# Patient Record
Sex: FEMALE | Race: ASIAN | HISPANIC OR LATINO | Employment: OTHER | URBAN - METROPOLITAN AREA
[De-identification: names, ages, dates, MRNs, and addresses within clinical notes are randomized per-mention and may not be internally consistent; named-entity substitution may affect disease eponyms.]

---

## 2022-05-27 LAB — RUBELLA ANTIBODY IGG (EXTERNAL): NORMAL

## 2022-07-07 ENCOUNTER — HOSPITAL ENCOUNTER (OUTPATIENT)
Facility: CLINIC | Age: 27
Discharge: HOME | End: 2022-07-08
Attending: ADVANCED PRACTICE MIDWIFE | Admitting: MIDWIFE
Payer: COMMERCIAL

## 2022-07-07 ENCOUNTER — TRANSFERRED RECORDS (OUTPATIENT)
Dept: OTHER | Facility: CLINIC | Age: 27
End: 2022-07-07

## 2022-07-07 DIAGNOSIS — O23.599 BACTERIAL VAGINOSIS IN PREGNANCY: Primary | ICD-10-CM

## 2022-07-07 DIAGNOSIS — B96.89 BACTERIAL VAGINOSIS IN PREGNANCY: Primary | ICD-10-CM

## 2022-07-07 LAB
ALBUMIN UR-MCNC: NEGATIVE MG/DL
APPEARANCE UR: CLEAR
BILIRUB UR QL STRIP: NEGATIVE
CLUE CELLS: PRESENT
COLOR UR AUTO: YELLOW
GLUCOSE UR STRIP-MCNC: NEGATIVE MG/DL
GROUP B STREPTOCOCCUS (EXTERNAL): POSITIVE
HGB UR QL STRIP: NEGATIVE
KETONES UR STRIP-MCNC: 10 MG/DL
LEUKOCYTE ESTERASE UR QL STRIP: NEGATIVE
MUCOUS THREADS #/AREA URNS LPF: PRESENT /LPF
NITRATE UR QL: NEGATIVE
PH UR STRIP: 5.5 [PH] (ref 5–7)
RBC URINE: 1 /HPF
RUPTURE OF FETAL MEMBRANES BY ROM PLUS: NEGATIVE
SARS-COV-2 RNA RESP QL NAA+PROBE: NEGATIVE
SP GR UR STRIP: 1.02 (ref 1–1.03)
SQUAMOUS EPITHELIAL: 2 /HPF
TRICHOMONAS, WET PREP: ABNORMAL
UROBILINOGEN UR STRIP-MCNC: NORMAL MG/DL
WBC URINE: 2 /HPF
WBC'S/HIGH POWER FIELD, WET PREP: ABNORMAL
YEAST, WET PREP: ABNORMAL

## 2022-07-07 PROCEDURE — 87077 CULTURE AEROBIC IDENTIFY: CPT | Performed by: OBSTETRICS & GYNECOLOGY

## 2022-07-07 PROCEDURE — 250N000011 HC RX IP 250 OP 636

## 2022-07-07 PROCEDURE — 258N000003 HC RX IP 258 OP 636: Performed by: MIDWIFE

## 2022-07-07 PROCEDURE — G0463 HOSPITAL OUTPT CLINIC VISIT: HCPCS | Mod: 25

## 2022-07-07 PROCEDURE — 96360 HYDRATION IV INFUSION INIT: CPT

## 2022-07-07 PROCEDURE — U0005 INFEC AGEN DETEC AMPLI PROBE: HCPCS | Performed by: OBSTETRICS & GYNECOLOGY

## 2022-07-07 PROCEDURE — 87210 SMEAR WET MOUNT SALINE/INK: CPT | Performed by: OBSTETRICS & GYNECOLOGY

## 2022-07-07 PROCEDURE — 96372 THER/PROPH/DIAG INJ SC/IM: CPT

## 2022-07-07 PROCEDURE — 81003 URINALYSIS AUTO W/O SCOPE: CPT | Performed by: MIDWIFE

## 2022-07-07 PROCEDURE — 59025 FETAL NON-STRESS TEST: CPT

## 2022-07-07 PROCEDURE — 258N000003 HC RX IP 258 OP 636: Performed by: OBSTETRICS & GYNECOLOGY

## 2022-07-07 PROCEDURE — 84112 EVAL AMNIOTIC FLUID PROTEIN: CPT | Performed by: MIDWIFE

## 2022-07-07 PROCEDURE — 250N000013 HC RX MED GY IP 250 OP 250 PS 637: Performed by: OBSTETRICS & GYNECOLOGY

## 2022-07-07 RX ORDER — PROCHLORPERAZINE 25 MG
25 SUPPOSITORY, RECTAL RECTAL EVERY 12 HOURS PRN
Status: DISCONTINUED | OUTPATIENT
Start: 2022-07-07 | End: 2022-07-08 | Stop reason: HOSPADM

## 2022-07-07 RX ORDER — PRENATAL VIT/IRON FUM/FOLIC AC 27MG-0.8MG
TABLET ORAL DAILY
Status: DISCONTINUED | OUTPATIENT
Start: 2022-07-08 | End: 2022-07-08 | Stop reason: HOSPADM

## 2022-07-07 RX ORDER — BETAMETHASONE SODIUM PHOSPHATE AND BETAMETHASONE ACETATE 3; 3 MG/ML; MG/ML
INJECTION, SUSPENSION INTRA-ARTICULAR; INTRALESIONAL; INTRAMUSCULAR; SOFT TISSUE
Status: COMPLETED
Start: 2022-07-07 | End: 2022-07-07

## 2022-07-07 RX ORDER — SODIUM CHLORIDE 9 MG/ML
INJECTION, SOLUTION INTRAVENOUS CONTINUOUS
Status: DISCONTINUED | OUTPATIENT
Start: 2022-07-07 | End: 2022-07-08 | Stop reason: HOSPADM

## 2022-07-07 RX ORDER — ONDANSETRON 4 MG/1
4 TABLET, ORALLY DISINTEGRATING ORAL EVERY 6 HOURS PRN
Status: DISCONTINUED | OUTPATIENT
Start: 2022-07-07 | End: 2022-07-08 | Stop reason: HOSPADM

## 2022-07-07 RX ORDER — BETAMETHASONE SODIUM PHOSPHATE AND BETAMETHASONE ACETATE 3; 3 MG/ML; MG/ML
12 INJECTION, SUSPENSION INTRA-ARTICULAR; INTRALESIONAL; INTRAMUSCULAR; SOFT TISSUE ONCE
Status: COMPLETED | OUTPATIENT
Start: 2022-07-07 | End: 2022-07-07

## 2022-07-07 RX ORDER — PROCHLORPERAZINE MALEATE 5 MG
10 TABLET ORAL EVERY 6 HOURS PRN
Status: DISCONTINUED | OUTPATIENT
Start: 2022-07-07 | End: 2022-07-08 | Stop reason: HOSPADM

## 2022-07-07 RX ORDER — METOCLOPRAMIDE 10 MG/1
10 TABLET ORAL EVERY 6 HOURS PRN
Status: DISCONTINUED | OUTPATIENT
Start: 2022-07-07 | End: 2022-07-08 | Stop reason: HOSPADM

## 2022-07-07 RX ORDER — METOCLOPRAMIDE HYDROCHLORIDE 5 MG/ML
10 INJECTION INTRAMUSCULAR; INTRAVENOUS EVERY 6 HOURS PRN
Status: DISCONTINUED | OUTPATIENT
Start: 2022-07-07 | End: 2022-07-08 | Stop reason: HOSPADM

## 2022-07-07 RX ORDER — HYDROXYZINE HYDROCHLORIDE 25 MG/1
25 TABLET, FILM COATED ORAL
Status: DISCONTINUED | OUTPATIENT
Start: 2022-07-07 | End: 2022-07-08 | Stop reason: HOSPADM

## 2022-07-07 RX ORDER — NIFEDIPINE 10 MG/1
20 CAPSULE ORAL EVERY 6 HOURS
Status: DISCONTINUED | OUTPATIENT
Start: 2022-07-07 | End: 2022-07-08 | Stop reason: HOSPADM

## 2022-07-07 RX ORDER — TERBUTALINE SULFATE 1 MG/ML
0.25 INJECTION, SOLUTION SUBCUTANEOUS ONCE
Status: COMPLETED | OUTPATIENT
Start: 2022-07-07 | End: 2022-07-07

## 2022-07-07 RX ORDER — ONDANSETRON 2 MG/ML
4 INJECTION INTRAMUSCULAR; INTRAVENOUS EVERY 6 HOURS PRN
Status: DISCONTINUED | OUTPATIENT
Start: 2022-07-07 | End: 2022-07-08 | Stop reason: HOSPADM

## 2022-07-07 RX ORDER — TERBUTALINE SULFATE 1 MG/ML
INJECTION, SOLUTION SUBCUTANEOUS
Status: COMPLETED
Start: 2022-07-07 | End: 2022-07-07

## 2022-07-07 RX ORDER — METRONIDAZOLE 500 MG/1
500 TABLET ORAL 2 TIMES DAILY
Status: DISCONTINUED | OUTPATIENT
Start: 2022-07-08 | End: 2022-07-08 | Stop reason: HOSPADM

## 2022-07-07 RX ADMIN — BETAMETHASONE SODIUM PHOSPHATE AND BETAMETHASONE ACETATE 12 MG: 3; 3 INJECTION, SUSPENSION INTRA-ARTICULAR; INTRALESIONAL; INTRAMUSCULAR at 18:24

## 2022-07-07 RX ADMIN — TERBUTALINE SULFATE 0.25 MG: 1 INJECTION, SOLUTION SUBCUTANEOUS at 18:22

## 2022-07-07 RX ADMIN — BETAMETHASONE SODIUM PHOSPHATE AND BETAMETHASONE ACETATE 12 MG: 3; 3 INJECTION, SUSPENSION INTRA-ARTICULAR; INTRALESIONAL; INTRAMUSCULAR; SOFT TISSUE at 18:24

## 2022-07-07 RX ADMIN — SODIUM CHLORIDE: 9 INJECTION, SOLUTION INTRAVENOUS at 21:49

## 2022-07-07 RX ADMIN — TERBUTALINE SULFATE 0.25 MG: 1 INJECTION SUBCUTANEOUS at 18:22

## 2022-07-07 RX ADMIN — SODIUM CHLORIDE, POTASSIUM CHLORIDE, SODIUM LACTATE AND CALCIUM CHLORIDE 1000 ML: 600; 310; 30; 20 INJECTION, SOLUTION INTRAVENOUS at 16:39

## 2022-07-07 RX ADMIN — HYDROXYZINE HYDROCHLORIDE 25 MG: 25 TABLET, FILM COATED ORAL at 23:05

## 2022-07-07 RX ADMIN — NIFEDIPINE 20 MG: 10 CAPSULE ORAL at 21:32

## 2022-07-07 NOTE — PLAN OF CARE
"Pt comes to triage with c/o some intermittent abdominal pain/pressure, as well as a 1\"x3\" strip of white mucous in her panties.    EUM/US applied. VSS. Admission database obtained and prenatal record reviewed.    DARRYN Mari called to discuss Pt's status. Order received for   UA, SVE, and ROM+.    SVE performed by RN with no previous office exam for comparison..    Update to DARRYN Mari, and oral hydration/discharge order received. CNM called again a short while later, and Pt's ctxs had gotten closer and stronger.  IV hydration order received.    IV fluids given. Mallory paged after completion, but she was in a delivery, so she stated she would come see ME/Pt when she was done.     CNM reviewed strip and increase in ctxs. Order received for Terbutaline/Betamethasone. These meds were given, while CNM updated Dr. Majano.    Pt stated that ctxs are getting less intense, and she doesn't feel all that are being traced.    Report given to Sarita Modi RN.   "

## 2022-07-08 ENCOUNTER — HOSPITAL ENCOUNTER (OUTPATIENT)
Facility: CLINIC | Age: 27
End: 2022-07-08
Admitting: MIDWIFE
Payer: COMMERCIAL

## 2022-07-08 VITALS
BODY MASS INDEX: 28.49 KG/M2 | DIASTOLIC BLOOD PRESSURE: 65 MMHG | TEMPERATURE: 98 F | HEIGHT: 65 IN | OXYGEN SATURATION: 95 % | SYSTOLIC BLOOD PRESSURE: 108 MMHG | WEIGHT: 171 LBS | RESPIRATION RATE: 16 BRPM

## 2022-07-08 LAB
ABO/RH(D): NORMAL
ANTIBODY SCREEN: NEGATIVE
ERYTHROCYTE [DISTWIDTH] IN BLOOD BY AUTOMATED COUNT: 12.1 % (ref 10–15)
HCT VFR BLD AUTO: 32.4 % (ref 35–47)
HGB BLD-MCNC: 11.2 G/DL (ref 11.7–15.7)
MCH RBC QN AUTO: 32.7 PG (ref 26.5–33)
MCHC RBC AUTO-ENTMCNC: 34.6 G/DL (ref 31.5–36.5)
MCV RBC AUTO: 95 FL (ref 78–100)
PLATELET # BLD AUTO: 275 10E3/UL (ref 150–450)
RBC # BLD AUTO: 3.43 10E6/UL (ref 3.8–5.2)
SPECIMEN EXPIRATION DATE: NORMAL
WBC # BLD AUTO: 10.7 10E3/UL (ref 4–11)

## 2022-07-08 PROCEDURE — 250N000011 HC RX IP 250 OP 636: Performed by: OBSTETRICS & GYNECOLOGY

## 2022-07-08 PROCEDURE — 258N000003 HC RX IP 258 OP 636: Performed by: OBSTETRICS & GYNECOLOGY

## 2022-07-08 PROCEDURE — 86850 RBC ANTIBODY SCREEN: CPT | Performed by: OBSTETRICS & GYNECOLOGY

## 2022-07-08 PROCEDURE — 96372 THER/PROPH/DIAG INJ SC/IM: CPT | Performed by: OBSTETRICS & GYNECOLOGY

## 2022-07-08 PROCEDURE — 36415 COLL VENOUS BLD VENIPUNCTURE: CPT | Performed by: OBSTETRICS & GYNECOLOGY

## 2022-07-08 PROCEDURE — 85027 COMPLETE CBC AUTOMATED: CPT | Performed by: OBSTETRICS & GYNECOLOGY

## 2022-07-08 PROCEDURE — 250N000013 HC RX MED GY IP 250 OP 250 PS 637: Performed by: OBSTETRICS & GYNECOLOGY

## 2022-07-08 RX ORDER — BETAMETHASONE SODIUM PHOSPHATE AND BETAMETHASONE ACETATE 3; 3 MG/ML; MG/ML
12 INJECTION, SUSPENSION INTRA-ARTICULAR; INTRALESIONAL; INTRAMUSCULAR; SOFT TISSUE ONCE
Status: COMPLETED | OUTPATIENT
Start: 2022-07-08 | End: 2022-07-08

## 2022-07-08 RX ORDER — METRONIDAZOLE 500 MG/1
500 TABLET ORAL 2 TIMES DAILY
Qty: 10 TABLET | Refills: 0 | Status: SHIPPED | OUTPATIENT
Start: 2022-07-08 | End: 2022-07-13

## 2022-07-08 RX ADMIN — PRENATAL VITAMINS-IRON FUMARATE 27 MG IRON-FOLIC ACID 0.8 MG TABLET 1 TABLET: at 12:52

## 2022-07-08 RX ADMIN — METRONIDAZOLE 500 MG: 500 TABLET, FILM COATED ORAL at 03:09

## 2022-07-08 RX ADMIN — METRONIDAZOLE 500 MG: 500 TABLET, FILM COATED ORAL at 09:03

## 2022-07-08 RX ADMIN — NIFEDIPINE 20 MG: 10 CAPSULE ORAL at 03:09

## 2022-07-08 RX ADMIN — METRONIDAZOLE 500 MG: 500 TABLET, FILM COATED ORAL at 17:57

## 2022-07-08 RX ADMIN — BETAMETHASONE SODIUM PHOSPHATE AND BETAMETHASONE ACETATE 12 MG: 3; 3 INJECTION, SUSPENSION INTRA-ARTICULAR; INTRALESIONAL; INTRAMUSCULAR at 18:00

## 2022-07-08 RX ADMIN — NIFEDIPINE 20 MG: 10 CAPSULE ORAL at 15:01

## 2022-07-08 RX ADMIN — SODIUM CHLORIDE: 9 INJECTION, SOLUTION INTRAVENOUS at 05:44

## 2022-07-08 RX ADMIN — NIFEDIPINE 20 MG: 10 CAPSULE ORAL at 09:48

## 2022-07-08 NOTE — PLAN OF CARE
Category 1 tracing.  Pt not feeling ctx.  Pt denies bleeding, ctx or leaking fluid.  Phone call to Dr Springer, orders given to discontinue continuous fetal monitoring, saline lock IV, give Betamethasone 2nd dose at 1824.  Orders to discharge patient after betamethasone if patient remains stable.  Will continue to monitor and assess.

## 2022-07-08 NOTE — PROVIDER NOTIFICATION
07/07/22 1952   Provider Notification   Provider Name/Title Dr Majano   Method of Notification Phone   Request Evaluate - Remote   Notification Reason Uterine Activity;Pain       Admit patient for overnight observation due to frequency of contractions.

## 2022-07-08 NOTE — PROGRESS NOTES
"OB Antepartum Note - Hospital Day 2    S:  Patient is doing well.  Decreasing contractions. The patient was sleeping soundly when rounds were made  No vaginal bleeding.  Baby active.  No LOF.    Tolerating diet.      O:  /59   Temp 98.4  F (36.9  C) (Temporal)   Resp 14   Ht 1.651 m (5' 5\")   Wt 77.6 kg (171 lb)   LMP 2021   SpO2 95%   BMI 28.46 kg/m    Gen-A&O, NAD  Abd- Gravid, non-tender  EFM-  Baseline 135, accels are present, moderate variability, no decelerations  [place]- uterine irritability only  Cervix-  Cervix not rechecked since admission (was FT, 0% effaced, -4 station)       A/P: 26 year old  @ 34w4d HD # 2 admitted with contractions but no cervical change.  Status post IV fluid and sub Q terbutaline for tocolysis with minimal response.  No cervical change since initial evaluation.  Status post first dose of Betamethasone at 1800 hours, now on oral Nifedipine for tocolysis.  Decreasing contractions.     1.  Routine cares  2.  Continue oral Nifedipine as ordered  3.  Second dose of Betamethasone this evening  4.  If the patient remains stable with no increase in contractions, discharge home after second and final steroid dose.   5.  All questions answered.      Kishor Springer MD  2022    "

## 2022-07-08 NOTE — PLAN OF CARE
Pt denies feeling ctx, leaking fluid or bleeding.  Pt states she feels active fetal movements.  Discharge orders given by Dr Springer.  Plan to see patient in clinic this week.  Discharge teaching, warning signs and when to call , pt verbalized understanding.  Pt to get betamethasone shot as ordered and will discharge home.  Will continue to monitor and assess.

## 2022-07-08 NOTE — PLAN OF CARE
"Patient slept well throughout the evening.  States her contractions have decreased and \"feel better\"    "

## 2022-07-08 NOTE — PLAN OF CARE
Patient wanting to settle in and sleep for the evening, atarax given to sleep at this time.  Patient states her contractions still feel about the same as before, maybe a little less.  Denies any vaginal bleeding or vaginal leaking and reports positive fetal movement.

## 2022-07-08 NOTE — DISCHARGE INSTRUCTIONS
Discharge Instruction for Undelivered Patients      You were seen for: Labor Assessment and Fetal Assessment  We Consulted: Dr Springer  You had (Test or Medicine):Betamethasone, fetal monitoring, Nifedipine     Diet:   Drink 8 to 12 glasses of liquids (milk, juice, water) every day.  You may eat meals and snacks.     Activity:  Call your doctor or nurse midwife if your baby is moving less than usual.     Call your provider if you notice:  Swelling in your face or increased swelling in your hands or legs.  Headaches that are not relieved by Tylenol (acetaminophen).  Changes in your vision (blurring: seeing spots or stars.)  Nausea (sick to your stomach) and vomiting (throwing up).   Weight gain of 5 pounds or more per week.  Heartburn that doesn't go away.  Signs of bladder infection: pain when you urinate (use the toilet), need to go more often and more urgently.  The bag of mckeon (rupture of membranes) breaks, or you notice leaking in your underwear.  Bright red blood in your underwear.  Abdominal (lower belly) or stomach pain.  For first baby: Contractions (tightening) less than 5 minutes apart for one hour or more.  Second (plus) baby: Contractions (tightening) less than 10 minutes apart and getting stronger.  *If less than 34 weeks: Contractions (tightening) more than 6 times in one hour.  Increase or change in vaginal discharge (note the color and amount)  Other:     Follow-up:  Follow up next week in clinic

## 2022-07-08 NOTE — H&P
"OB Brief Admit H&P    No significant change in general health status based on examination of the patient, review of Nursing Admission Database and prenatal record.    Pt is a 26 year old  @ 34w3d who presented to L&D with abdominal pain/contractions and white mucous discharge. This is new for her, uncomplicated pregnancy.    Patient's prenatal course has been complicated by:   1. FEI at 26 weeks from [facility]  2. Rubella non immune    Prenatal Labs:    Blood type O  Rubella non immune  GCT 87  GBS negative    EFW: 4.5lb    /65   Ht 1.651 m (5' 5\")   Wt 77.6 kg (171 lb)   LMP 2021   SpO2 95%   BMI 28.46 kg/m    EFM:  Baseline 145, moderate variability, + accels, no decels, Cat I  Robbins: every 2-5min  SVE: 0.5/50%/-3  Membranes:  Intact    UA neg (small ketones)  ROM negative    Assessment:  26 year old  @ 34w3d admitted for  uterine contractions, no cervical change    Plan:  1. Admit to labor and delivery   2.  contractions: No cervical change, but despite IV fluid bolus and terbutaline patient still cyrus every 2-4 minutes. S/p 1st dose of betamethasone at 6pm. Recommend admission over next 24hrs for PO nifedipine as tocolysis. If cervix unchanged then likely discharge home at that time.   3. Will collect GBS and wet prep    Suma Majano MD  2022  8:41 PM      "

## 2022-07-08 NOTE — PLAN OF CARE
Vss, afebrile.  Category 1 with monitoring this morning.  Pt denies bleeding, leaking of fluid or contractions.  Dr Majano in this morning to see patient to discuss plan.  Plan to schedule c/s in main OR tomorrow 7/8/22 @ 0730.  Pt in agreement with plan.  Pt coping well with bedrest.  Will continue to monitor and assess.

## 2022-07-13 LAB — BACTERIA SPEC CULT: ABNORMAL

## 2022-07-21 ENCOUNTER — LAB REQUISITION (OUTPATIENT)
Dept: LAB | Facility: CLINIC | Age: 27
End: 2022-07-21
Payer: COMMERCIAL

## 2022-07-21 DIAGNOSIS — Z3A.36 36 WEEKS GESTATION OF PREGNANCY: ICD-10-CM

## 2022-07-21 PROCEDURE — 87077 CULTURE AEROBIC IDENTIFY: CPT | Mod: ORL | Performed by: ADVANCED PRACTICE MIDWIFE

## 2022-07-27 LAB — BACTERIA SPEC CULT: ABNORMAL

## 2022-07-31 ENCOUNTER — HOSPITAL ENCOUNTER (OUTPATIENT)
Facility: CLINIC | Age: 27
End: 2022-07-31
Admitting: ADVANCED PRACTICE MIDWIFE
Payer: COMMERCIAL

## 2022-07-31 ENCOUNTER — HOSPITAL ENCOUNTER (OUTPATIENT)
Facility: CLINIC | Age: 27
Discharge: HOME | End: 2022-07-31
Attending: ADVANCED PRACTICE MIDWIFE | Admitting: ADVANCED PRACTICE MIDWIFE
Payer: COMMERCIAL

## 2022-07-31 ENCOUNTER — NURSE TRIAGE (OUTPATIENT)
Dept: NURSING | Facility: CLINIC | Age: 27
End: 2022-07-31

## 2022-07-31 VITALS
DIASTOLIC BLOOD PRESSURE: 66 MMHG | TEMPERATURE: 98.6 F | WEIGHT: 174 LBS | RESPIRATION RATE: 16 BRPM | BODY MASS INDEX: 27.97 KG/M2 | HEIGHT: 66 IN | SYSTOLIC BLOOD PRESSURE: 113 MMHG

## 2022-07-31 LAB — RUPTURE OF FETAL MEMBRANES BY ROM PLUS: NEGATIVE

## 2022-07-31 PROCEDURE — 250N000013 HC RX MED GY IP 250 OP 250 PS 637: Performed by: ADVANCED PRACTICE MIDWIFE

## 2022-07-31 PROCEDURE — G0463 HOSPITAL OUTPT CLINIC VISIT: HCPCS | Mod: 25

## 2022-07-31 PROCEDURE — 59025 FETAL NON-STRESS TEST: CPT

## 2022-07-31 PROCEDURE — 84112 EVAL AMNIOTIC FLUID PROTEIN: CPT | Performed by: ADVANCED PRACTICE MIDWIFE

## 2022-07-31 RX ORDER — METOCLOPRAMIDE 10 MG/1
10 TABLET ORAL EVERY 6 HOURS PRN
Status: DISCONTINUED | OUTPATIENT
Start: 2022-07-31 | End: 2022-07-31 | Stop reason: HOSPADM

## 2022-07-31 RX ORDER — HYDROXYZINE HYDROCHLORIDE 50 MG/1
100 TABLET, FILM COATED ORAL ONCE
Status: COMPLETED | OUTPATIENT
Start: 2022-07-31 | End: 2022-07-31

## 2022-07-31 RX ORDER — ONDANSETRON 4 MG/1
4 TABLET, ORALLY DISINTEGRATING ORAL EVERY 6 HOURS PRN
Status: DISCONTINUED | OUTPATIENT
Start: 2022-07-31 | End: 2022-07-31 | Stop reason: HOSPADM

## 2022-07-31 RX ORDER — PROCHLORPERAZINE 25 MG
25 SUPPOSITORY, RECTAL RECTAL EVERY 12 HOURS PRN
Status: DISCONTINUED | OUTPATIENT
Start: 2022-07-31 | End: 2022-07-31 | Stop reason: HOSPADM

## 2022-07-31 RX ORDER — ONDANSETRON 2 MG/ML
4 INJECTION INTRAMUSCULAR; INTRAVENOUS EVERY 6 HOURS PRN
Status: DISCONTINUED | OUTPATIENT
Start: 2022-07-31 | End: 2022-07-31 | Stop reason: HOSPADM

## 2022-07-31 RX ORDER — METOCLOPRAMIDE HYDROCHLORIDE 5 MG/ML
10 INJECTION INTRAMUSCULAR; INTRAVENOUS EVERY 6 HOURS PRN
Status: DISCONTINUED | OUTPATIENT
Start: 2022-07-31 | End: 2022-07-31 | Stop reason: HOSPADM

## 2022-07-31 RX ORDER — HYDROXYZINE HYDROCHLORIDE 50 MG/1
TABLET, FILM COATED ORAL
Status: DISCONTINUED
Start: 2022-07-31 | End: 2022-07-31 | Stop reason: HOSPADM

## 2022-07-31 RX ORDER — PROCHLORPERAZINE MALEATE 5 MG
10 TABLET ORAL EVERY 6 HOURS PRN
Status: DISCONTINUED | OUTPATIENT
Start: 2022-07-31 | End: 2022-07-31 | Stop reason: HOSPADM

## 2022-07-31 RX ADMIN — HYDROXYZINE HYDROCHLORIDE 100 MG: 50 TABLET, FILM COATED ORAL at 20:41

## 2022-07-31 NOTE — TELEPHONE ENCOUNTER
TELEPHONE CALL -    Pt called - wanted to sepak in Malay     RN gather her information to open the Chart in Syriac.  Then let her know that I will call her back with  service (lashae) since the called is recorded and it must be in English    The called ended   RN called back Number given [phone]  and Pt answerer and hung up X 2  LVM on 3rd  Call    Alexia Segovia RN [place] Nurse Advisor,  6:26 PM 7/31/2022

## 2022-08-01 ENCOUNTER — HOSPITAL ENCOUNTER (INPATIENT)
Facility: CLINIC | Age: 27
LOS: 4 days | Discharge: HOME | End: 2022-08-05
Attending: OBSTETRICS & GYNECOLOGY | Admitting: OBSTETRICS & GYNECOLOGY
Payer: COMMERCIAL

## 2022-08-01 ENCOUNTER — ANESTHESIA EVENT (OUTPATIENT)
Dept: OBGYN | Facility: CLINIC | Age: 27
End: 2022-08-01
Payer: COMMERCIAL

## 2022-08-01 ENCOUNTER — ANESTHESIA (OUTPATIENT)
Dept: OBGYN | Facility: CLINIC | Age: 27
End: 2022-08-01
Payer: COMMERCIAL

## 2022-08-01 PROBLEM — Z36.89 ENCOUNTER FOR TRIAGE IN PREGNANT PATIENT: Status: ACTIVE | Noted: 2022-08-01

## 2022-08-01 LAB
ABO/RH(D): NORMAL
ANTIBODY SCREEN: NEGATIVE
HOLD SPECIMEN: NORMAL
SARS-COV-2 RNA RESP QL NAA+PROBE: NEGATIVE
SPECIMEN EXPIRATION DATE: NORMAL
T PALLIDUM AB SER QL: NONREACTIVE

## 2022-08-01 PROCEDURE — 250N000011 HC RX IP 250 OP 636: Performed by: ANESTHESIOLOGY

## 2022-08-01 PROCEDURE — U0005 INFEC AGEN DETEC AMPLI PROBE: HCPCS | Performed by: OBSTETRICS & GYNECOLOGY

## 2022-08-01 PROCEDURE — 96372 THER/PROPH/DIAG INJ SC/IM: CPT | Performed by: ADVANCED PRACTICE MIDWIFE

## 2022-08-01 PROCEDURE — 250N000009 HC RX 250: Performed by: ANESTHESIOLOGY

## 2022-08-01 PROCEDURE — 00HU33Z INSERTION OF INFUSION DEVICE INTO SPINAL CANAL, PERCUTANEOUS APPROACH: ICD-10-PCS | Performed by: ANESTHESIOLOGY

## 2022-08-01 PROCEDURE — 86850 RBC ANTIBODY SCREEN: CPT | Performed by: OBSTETRICS & GYNECOLOGY

## 2022-08-01 PROCEDURE — 258N000003 HC RX IP 258 OP 636: Performed by: OBSTETRICS & GYNECOLOGY

## 2022-08-01 PROCEDURE — G0463 HOSPITAL OUTPT CLINIC VISIT: HCPCS | Mod: 25

## 2022-08-01 PROCEDURE — 250N000011 HC RX IP 250 OP 636: Performed by: OBSTETRICS & GYNECOLOGY

## 2022-08-01 PROCEDURE — 120N000001 HC R&B MED SURG/OB

## 2022-08-01 PROCEDURE — 250N000013 HC RX MED GY IP 250 OP 250 PS 637: Performed by: OBSTETRICS & GYNECOLOGY

## 2022-08-01 PROCEDURE — 10907ZC DRAINAGE OF AMNIOTIC FLUID, THERAPEUTIC FROM PRODUCTS OF CONCEPTION, VIA NATURAL OR ARTIFICIAL OPENING: ICD-10-PCS | Performed by: OBSTETRICS & GYNECOLOGY

## 2022-08-01 PROCEDURE — 3E0R3BZ INTRODUCTION OF ANESTHETIC AGENT INTO SPINAL CANAL, PERCUTANEOUS APPROACH: ICD-10-PCS | Performed by: ANESTHESIOLOGY

## 2022-08-01 PROCEDURE — 250N000013 HC RX MED GY IP 250 OP 250 PS 637: Performed by: ADVANCED PRACTICE MIDWIFE

## 2022-08-01 PROCEDURE — 250N000011 HC RX IP 250 OP 636: Performed by: ADVANCED PRACTICE MIDWIFE

## 2022-08-01 PROCEDURE — 86780 TREPONEMA PALLIDUM: CPT | Performed by: OBSTETRICS & GYNECOLOGY

## 2022-08-01 RX ORDER — FENTANYL CITRATE 50 UG/ML
100 INJECTION, SOLUTION INTRAMUSCULAR; INTRAVENOUS
Status: DISCONTINUED | OUTPATIENT
Start: 2022-08-01 | End: 2022-08-02 | Stop reason: HOSPADM

## 2022-08-01 RX ORDER — OXYTOCIN 10 [USP'U]/ML
10 INJECTION, SOLUTION INTRAMUSCULAR; INTRAVENOUS
Status: DISCONTINUED | OUTPATIENT
Start: 2022-08-01 | End: 2022-08-02

## 2022-08-01 RX ORDER — METOCLOPRAMIDE HYDROCHLORIDE 5 MG/ML
10 INJECTION INTRAMUSCULAR; INTRAVENOUS EVERY 6 HOURS PRN
Status: DISCONTINUED | OUTPATIENT
Start: 2022-08-01 | End: 2022-08-02 | Stop reason: HOSPADM

## 2022-08-01 RX ORDER — CARBOPROST TROMETHAMINE 250 UG/ML
250 INJECTION, SOLUTION INTRAMUSCULAR
Status: DISCONTINUED | OUTPATIENT
Start: 2022-08-01 | End: 2022-08-02

## 2022-08-01 RX ORDER — NALOXONE HYDROCHLORIDE 0.4 MG/ML
0.2 INJECTION, SOLUTION INTRAMUSCULAR; INTRAVENOUS; SUBCUTANEOUS
Status: DISCONTINUED | OUTPATIENT
Start: 2022-08-01 | End: 2022-08-02

## 2022-08-01 RX ORDER — NALOXONE HYDROCHLORIDE 0.4 MG/ML
0.4 INJECTION, SOLUTION INTRAMUSCULAR; INTRAVENOUS; SUBCUTANEOUS
Status: DISCONTINUED | OUTPATIENT
Start: 2022-08-01 | End: 2022-08-02

## 2022-08-01 RX ORDER — OXYTOCIN/0.9 % SODIUM CHLORIDE 30/500 ML
340 PLASTIC BAG, INJECTION (ML) INTRAVENOUS CONTINUOUS PRN
Status: DISCONTINUED | OUTPATIENT
Start: 2022-08-01 | End: 2022-08-02

## 2022-08-01 RX ORDER — PROCHLORPERAZINE 25 MG
25 SUPPOSITORY, RECTAL RECTAL EVERY 12 HOURS PRN
Status: DISCONTINUED | OUTPATIENT
Start: 2022-08-01 | End: 2022-08-02 | Stop reason: HOSPADM

## 2022-08-01 RX ORDER — CITRIC ACID/SODIUM CITRATE 334-500MG
30 SOLUTION, ORAL ORAL
Status: DISCONTINUED | OUTPATIENT
Start: 2022-08-01 | End: 2022-08-02 | Stop reason: HOSPADM

## 2022-08-01 RX ORDER — ONDANSETRON 2 MG/ML
4 INJECTION INTRAMUSCULAR; INTRAVENOUS EVERY 6 HOURS PRN
Status: DISCONTINUED | OUTPATIENT
Start: 2022-08-01 | End: 2022-08-02 | Stop reason: HOSPADM

## 2022-08-01 RX ORDER — IBUPROFEN 800 MG/1
800 TABLET, FILM COATED ORAL
Status: DISCONTINUED | OUTPATIENT
Start: 2022-08-01 | End: 2022-08-02

## 2022-08-01 RX ORDER — KETOROLAC TROMETHAMINE 30 MG/ML
30 INJECTION, SOLUTION INTRAMUSCULAR; INTRAVENOUS
Status: DISCONTINUED | OUTPATIENT
Start: 2022-08-01 | End: 2022-08-02

## 2022-08-01 RX ORDER — SODIUM CHLORIDE, SODIUM LACTATE, POTASSIUM CHLORIDE, CALCIUM CHLORIDE 600; 310; 30; 20 MG/100ML; MG/100ML; MG/100ML; MG/100ML
INJECTION, SOLUTION INTRAVENOUS CONTINUOUS
Status: DISCONTINUED | OUTPATIENT
Start: 2022-08-01 | End: 2022-08-02 | Stop reason: HOSPADM

## 2022-08-01 RX ORDER — MORPHINE SULFATE 10 MG/ML
10 INJECTION, SOLUTION INTRAMUSCULAR; INTRAVENOUS ONCE
Status: COMPLETED | OUTPATIENT
Start: 2022-08-01 | End: 2022-08-01

## 2022-08-01 RX ORDER — MISOPROSTOL 200 UG/1
800 TABLET ORAL
Status: DISCONTINUED | OUTPATIENT
Start: 2022-08-01 | End: 2022-08-02

## 2022-08-01 RX ORDER — BUPIVACAINE HYDROCHLORIDE 2.5 MG/ML
10 INJECTION, SOLUTION EPIDURAL; INFILTRATION; INTRACAUDAL ONCE
Status: COMPLETED | OUTPATIENT
Start: 2022-08-01 | End: 2022-08-01

## 2022-08-01 RX ORDER — LIDOCAINE HYDROCHLORIDE AND EPINEPHRINE 15; 5 MG/ML; UG/ML
INJECTION, SOLUTION EPIDURAL PRN
Status: DISCONTINUED | OUTPATIENT
Start: 2022-08-01 | End: 2022-08-02

## 2022-08-01 RX ORDER — OXYTOCIN 10 [USP'U]/ML
10 INJECTION, SOLUTION INTRAMUSCULAR; INTRAVENOUS
Status: DISCONTINUED | OUTPATIENT
Start: 2022-08-01 | End: 2022-08-05 | Stop reason: HOSPADM

## 2022-08-01 RX ORDER — HYDROXYZINE HYDROCHLORIDE 50 MG/1
100 TABLET, FILM COATED ORAL ONCE
Status: COMPLETED | OUTPATIENT
Start: 2022-08-01 | End: 2022-08-01

## 2022-08-01 RX ORDER — PENICILLIN G 3000000 [IU]/50ML
3 INJECTION, SOLUTION INTRAVENOUS EVERY 4 HOURS
Status: DISCONTINUED | OUTPATIENT
Start: 2022-08-01 | End: 2022-08-02 | Stop reason: HOSPADM

## 2022-08-01 RX ORDER — NALBUPHINE HYDROCHLORIDE 10 MG/ML
2.5-5 INJECTION, SOLUTION INTRAMUSCULAR; INTRAVENOUS; SUBCUTANEOUS EVERY 6 HOURS PRN
Status: DISCONTINUED | OUTPATIENT
Start: 2022-08-01 | End: 2022-08-05 | Stop reason: HOSPADM

## 2022-08-01 RX ORDER — ONDANSETRON 4 MG/1
4 TABLET, ORALLY DISINTEGRATING ORAL EVERY 6 HOURS PRN
Status: DISCONTINUED | OUTPATIENT
Start: 2022-08-01 | End: 2022-08-02 | Stop reason: HOSPADM

## 2022-08-01 RX ORDER — ONDANSETRON 4 MG/1
4 TABLET, ORALLY DISINTEGRATING ORAL EVERY 6 HOURS PRN
Status: DISCONTINUED | OUTPATIENT
Start: 2022-08-01 | End: 2022-08-01

## 2022-08-01 RX ORDER — ACETAMINOPHEN 325 MG/1
650 TABLET ORAL EVERY 4 HOURS PRN
Status: DISCONTINUED | OUTPATIENT
Start: 2022-08-01 | End: 2022-08-02 | Stop reason: HOSPADM

## 2022-08-01 RX ORDER — FENTANYL CITRATE-0.9 % NACL/PF 10 MCG/ML
100 PLASTIC BAG, INJECTION (ML) INTRAVENOUS EVERY 5 MIN PRN
Status: DISCONTINUED | OUTPATIENT
Start: 2022-08-01 | End: 2022-08-02 | Stop reason: HOSPADM

## 2022-08-01 RX ORDER — MISOPROSTOL 200 UG/1
400 TABLET ORAL
Status: DISCONTINUED | OUTPATIENT
Start: 2022-08-01 | End: 2022-08-02

## 2022-08-01 RX ORDER — CALCIUM CARBONATE 500 MG/1
1000 TABLET, CHEWABLE ORAL DAILY PRN
Status: DISCONTINUED | OUTPATIENT
Start: 2022-08-01 | End: 2022-08-05 | Stop reason: HOSPADM

## 2022-08-01 RX ORDER — PROCHLORPERAZINE MALEATE 10 MG
10 TABLET ORAL EVERY 6 HOURS PRN
Status: DISCONTINUED | OUTPATIENT
Start: 2022-08-01 | End: 2022-08-02 | Stop reason: HOSPADM

## 2022-08-01 RX ORDER — SCOLOPAMINE TRANSDERMAL SYSTEM 1 MG/1
1 PATCH, EXTENDED RELEASE TRANSDERMAL
Status: DISCONTINUED | OUTPATIENT
Start: 2022-08-01 | End: 2022-08-02 | Stop reason: HOSPADM

## 2022-08-01 RX ORDER — ONDANSETRON 2 MG/ML
4 INJECTION INTRAMUSCULAR; INTRAVENOUS EVERY 6 HOURS PRN
Status: DISCONTINUED | OUTPATIENT
Start: 2022-08-01 | End: 2022-08-01

## 2022-08-01 RX ORDER — TRANEXAMIC ACID 10 MG/ML
1 INJECTION, SOLUTION INTRAVENOUS EVERY 30 MIN PRN
Status: DISCONTINUED | OUTPATIENT
Start: 2022-08-01 | End: 2022-08-02

## 2022-08-01 RX ORDER — PENICILLIN G POTASSIUM 5000000 [IU]/1
5 INJECTION, POWDER, FOR SOLUTION INTRAMUSCULAR; INTRAVENOUS ONCE
Status: COMPLETED | OUTPATIENT
Start: 2022-08-01 | End: 2022-08-01

## 2022-08-01 RX ORDER — METOCLOPRAMIDE 10 MG/1
10 TABLET ORAL EVERY 6 HOURS PRN
Status: DISCONTINUED | OUTPATIENT
Start: 2022-08-01 | End: 2022-08-02 | Stop reason: HOSPADM

## 2022-08-01 RX ORDER — OXYTOCIN/0.9 % SODIUM CHLORIDE 30/500 ML
100-340 PLASTIC BAG, INJECTION (ML) INTRAVENOUS CONTINUOUS PRN
Status: DISCONTINUED | OUTPATIENT
Start: 2022-08-01 | End: 2022-08-05 | Stop reason: HOSPADM

## 2022-08-01 RX ORDER — METHYLERGONOVINE MALEATE 0.2 MG/ML
200 INJECTION INTRAVENOUS
Status: DISCONTINUED | OUTPATIENT
Start: 2022-08-01 | End: 2022-08-02

## 2022-08-01 RX ADMIN — Medication: at 13:30

## 2022-08-01 RX ADMIN — PENICILLIN G 3 MILLION UNITS: 3000000 INJECTION, SOLUTION INTRAVENOUS at 20:55

## 2022-08-01 RX ADMIN — Medication: at 22:03

## 2022-08-01 RX ADMIN — SODIUM CHLORIDE, POTASSIUM CHLORIDE, SODIUM LACTATE AND CALCIUM CHLORIDE: 600; 310; 30; 20 INJECTION, SOLUTION INTRAVENOUS at 08:52

## 2022-08-01 RX ADMIN — PENICILLIN G POTASSIUM 5 MILLION UNITS: 5000000 POWDER, FOR SOLUTION INTRAMUSCULAR; INTRAPLEURAL; INTRATHECAL; INTRAVENOUS at 08:52

## 2022-08-01 RX ADMIN — LIDOCAINE HYDROCHLORIDE,EPINEPHRINE BITARTRATE 3 ML: 15; .005 INJECTION, SOLUTION EPIDURAL; INFILTRATION; INTRACAUDAL; PERINEURAL at 13:22

## 2022-08-01 RX ADMIN — HYDROXYZINE HYDROCHLORIDE 100 MG: 50 TABLET, FILM COATED ORAL at 05:13

## 2022-08-01 RX ADMIN — PENICILLIN G 3 MILLION UNITS: 3000000 INJECTION, SOLUTION INTRAVENOUS at 16:53

## 2022-08-01 RX ADMIN — BUPIVACAINE HYDROCHLORIDE 5 ML: 2.5 INJECTION, SOLUTION EPIDURAL; INFILTRATION; INTRACAUDAL at 13:24

## 2022-08-01 RX ADMIN — PENICILLIN G 3 MILLION UNITS: 3000000 INJECTION, SOLUTION INTRAVENOUS at 12:59

## 2022-08-01 RX ADMIN — METOCLOPRAMIDE HYDROCHLORIDE 10 MG: 5 INJECTION INTRAMUSCULAR; INTRAVENOUS at 22:05

## 2022-08-01 RX ADMIN — BUPIVACAINE HYDROCHLORIDE 5 ML: 2.5 INJECTION, SOLUTION EPIDURAL; INFILTRATION; INTRACAUDAL at 13:46

## 2022-08-01 RX ADMIN — MORPHINE SULFATE 10 MG: 10 INJECTION INTRAVENOUS at 05:13

## 2022-08-01 RX ADMIN — CALCIUM CARBONATE (ANTACID) CHEW TAB 500 MG 1000 MG: 500 CHEW TAB at 10:02

## 2022-08-01 ASSESSMENT — ACTIVITIES OF DAILY LIVING (ADL)
DRESSING/BATHING_DIFFICULTY: NO
FALL_HISTORY_WITHIN_LAST_SIX_MONTHS: NO
VISION_MANAGEMENT: GLASSES
CHANGE_IN_FUNCTIONAL_STATUS_SINCE_ONSET_OF_CURRENT_ILLNESS/INJURY: NO
ADLS_ACUITY_SCORE: 20
WEAR_GLASSES_OR_BLIND: YES
ADLS_ACUITY_SCORE: 20
DIFFICULTY_COMMUNICATING: NO
ADLS_ACUITY_SCORE: 20
CONCENTRATING,_REMEMBERING_OR_MAKING_DECISIONS_DIFFICULTY: NO
ADLS_ACUITY_SCORE: 20
DOING_ERRANDS_INDEPENDENTLY_DIFFICULTY: NO
TOILETING_ISSUES: NO
WALKING_OR_CLIMBING_STAIRS_DIFFICULTY: NO
HEARING_DIFFICULTY_OR_DEAF: NO
ADLS_ACUITY_SCORE: 20
DIFFICULTY_EATING/SWALLOWING: NO
ADLS_ACUITY_SCORE: 20

## 2022-08-01 NOTE — PROVIDER NOTIFICATION
"   08/01/22 3904   Provider Notification   Provider Name/Title ROHIT Hernandez   Method of Notification Phone   Request Evaluate - Remote   Notification Reason Pain     RN discussing pain control options with the patient.  Patient educated on epidural, fentanyl, and morphine/vistaril options.  Patient understands if she gets and epidural she is unable to eat until delivery and that fentanyl will be the most effective with the first dose and less effective after that.  Patient states that yesterday she was only given \"a pill\" and didn't have morphine.  Patient would like to try morphine and vistaril and try and rest and re-evaluate in a couple of hours.  CNM is okay with this option.  Orders for 100 mg of vistaril and 10 mg of morphine received.  Okay to discontinue FHT and uterine monitoring 30 minutes after giving medications. Patient moved for 409. Will update CNM as necessary.    "

## 2022-08-01 NOTE — PLAN OF CARE
Data: [facility]: 2022  4:03 AM.  Reason for maternal/fetal assessment is uterine contractions. Patient reports contractions started 2022 0800.  Patient is a .  Prenatal record reviewed. Pregnancy has been uncomplicated..  Gestational Age 38w0d. VSS. Fetal movement active. Patient denies leaking of vaginal fluid/rupture of membranes, vaginal bleeding, pelvic pressure, nausea, vomiting, headache, visual disturbances, epigastric or URQ pain, significant edema. Support person is present.   Action: Verbal consent for EFM. Triage assessment completed. Bill of rights reviewed.  Response: Patient verbalized agreement with plan. Will contact ADRIANA Hernandez with update and for further orders.

## 2022-08-01 NOTE — DISCHARGE INSTRUCTIONS
Undelivered Patients Discharge Instructions: [place]    La vieron por: Evaluación de ruptura de membrana   Consultamos a: Mary Hoskins CNM  Le hicieron/recibió (examen o medicamento):ROM Plus, examen para evaluación de la cervix    Dieta:    Continue con mena dieta regular     Actividad:  Monitorea la actividad de mena mart seth le ha indicado mena proveedor obstetrico en la clinica    Llame a mena proveedor si nota:  Hinchazón en el savita o aumento de la hinchazón en alysia silvana o piernas.  Maritza de shelbie que no se alivian con Tylenol (acetaminofén).  Cambios en mena visión (borrosa: ve manchas o estrellas.)  Náuseas (sensación de malestar estomacal) y vómitos (devolver).  Aumento de peso de 5 libras o más por semana.  Acidez estomacal que no se marco a.  Signos de infección de vejiga: dolor al orinar (hacer pis), necesidad de ir con más frecuencia y más urgencia.  Se rompe la bolsa (ruptura de membranas) o nota que gotea en mena ropa interior.  Diego mango brillante en mena ropa interior.  Dolor en la parte baja del vientre (abdomen) o estómago.  Para el primer bebé: Contracciones (tirantez) con menos de 5 minutos de diferencia entre jong y otra por jong hora o más.  Aumento o cambio en las secreciones vaginales (note el color y la cantidad)      Seguimiento: Visite mena clinica obstétrica el andrew de la surya prevista la semana entrante. Llame a mena clinica antes de la surya prevista si tiene dudas y/o preguntas, o si experimenta alguna de las situaciones discutidas arriba.

## 2022-08-01 NOTE — PROVIDER NOTIFICATION
08/01/22 1235   Provider Notification   Provider Name/Title Dr. Etienne   Method of Notification At Bedside   Request Evaluate in Person   Notification Reason SVE;Labor Status     Patient is 4/90/-2 and wishes to get an epidural before AROM.  MD is okay with that plan.  RN will update MD when patient is comfortable.  Patient will have second dose of Penicillin due at 1252.  Patient would like to ambulate for a few minutes before her epidural and MD is okay with that.  Will update as necessary.

## 2022-08-01 NOTE — PROVIDER NOTIFICATION
08/01/22 1220   Provider Notification   Provider Name/Title Dr. Etienne   Method of Notification At Bedside   Request Evaluate in Person   Notification Reason Labor Status     MD at the bedside discussing the plan of care with the patient.   ID:  used.  Patient states that her contractions are getting a little more painful than they were before.  MD discussing starting pitocin before AROM and epidural and/or the patient getting and epidural then AROM and starting pitocin.  MD discussed the benefits of both options with the patient.  All her questions were answered.  The patient wants to make sure that her  is able to be back at the hospital before the baby is born.  MD also gave the patient the choice of SVE before determining a plan.  Patient desires SVE and to make a plan after that.

## 2022-08-01 NOTE — PROGRESS NOTES
"OB Brief Admit H&P    No significant change in general health status based on examination of the patient, review of Nursing Admission Database and prenatal record.    Pt is a 27 year old  @ 38w0d who presented to L&D with labor.    Patient's prenatal course has been complicated by FEI at 24 weeks, Rubella NI    Prenatal Labs:    Blood type O+  Rubella imm  GCT 87  GBS +    EFW: 8lbs    /66 (BP Location: Right arm, Patient Position: Semi-Be's, Cuff Size: Adult Regular)   Temp 98.9  F (37.2  C) (Oral)   Resp 16   Ht 1.676 m (5' 6\")   Wt 78.9 kg (174 lb)   LMP 2021   BMI 28.08 kg/m    EFM:  130 Mod V + accels no decels  Garner: q2min  SVE: 3/90%/-2  Membranes:  intact    ASSESSMENT/PLAN:  Nissa Merida  is a 27 year old   At 38w0d by LMP who presents for labor.  1.  Admit to L&D  2. Labor sponmtaneous  3.  Fetal status is Category 1 with accelerations  4.  Pain management: prn  5.  GBS +, start pcn per protocol  6. Anticipate   7. Immunize for rubella pp.     Edil Etienne DO  Dept of OB/GYN  2022       "

## 2022-08-01 NOTE — ANESTHESIA PROCEDURE NOTES
Epidural catheter Procedure Note    Pre-Procedure   Staff -        Anesthesiologist:  Marco Alex MD       Performed By: anesthesiologist       Referred By: Jaimie       Location: OB       Pre-Anesthestic Checklist: patient identified, IV checked, risks and benefits discussed, informed consent, monitors and equipment checked, pre-op evaluation, at physician/surgeon's request and post-op pain management  Timeout:       Correct Patient: Yes        Correct Procedure: Yes        Correct Site: Yes        Correct Position: Yes   Procedure Documentation  Procedure: epidural catheter   Comments:  Patient desires Labor Epidural for labor analgesia. Vaginal delivery anticipated.    Chart reviewed. Patient examined. No changes to pre procedure chart review. Risks including but not limited to bleeding, infection, nerve injury, PDPH, intrathecal injection, high block, incomplete block, one-sided block, back pain, and low blood pressure discussed in detail. Questions answered. Consent signed.    Pause for the Cause completed. NIBP and pulse ox functioning. L&D nurse present.    Procedure: Sitting. Betadine prep x 3. Sterile drape applied.  Lidocaine 1% x 2 cc local infiltration at L 3-4.  17 G. Tu needle ML GHISLAINE 1 attempt.  No CSF, paresthesia or blood. 20 g. Epidural catheter inserted w/o resistance 5 cm.  Negative aspiration for CSF and blood. Filter in line.  Test dose Lidocaine 1.5% w/ 1:200,000 epi x 3 cc injected. Negative for neuro change or symptoms of intravascular injection.  Bolus dose: Marcaine 0.25% 5cc x 2 doses (10 cc total).  Infusion orders written.    I or my partner am immediately available. I or my partner will monitor the patient and supervise nursing care at necessary intervals.    JAKollitzMD

## 2022-08-01 NOTE — H&P
"OB Brief Admit H&P     No significant change in general health status based on examination of the patient, review of Nursing Admission Database and prenatal record.     Pt is a 27 year old  @ 38w0d who presented to L&D with labor.     Patient's prenatal course has been complicated by FEI at 24 weeks, Rubella NI     Prenatal Labs:    Blood type O+  Rubella imm  GCT 87  GBS +     EFW: 8lbs     /66 (BP Location: Right arm, Patient Position: Semi-Be's, Cuff Size: Adult Regular)   Temp 98.9  F (37.2  C) (Oral)   Resp 16   Ht 1.676 m (5' 6\")   Wt 78.9 kg (174 lb)   LMP 2021   BMI 28.08 kg/m    EFM:  130 Mod V + accels no decels  Botines: q2min  SVE: 3/90%/-2  Membranes:  intact     ASSESSMENT/PLAN:  Nissa Merida  is a 27 year old   At 38w0d by LMP who presents for labor.  1.  Admit to L&D  2. Labor sponmtaneous  3.  Fetal status is Category 1 with accelerations  4.  Pain management: prn  5.  GBS +, start pcn per protocol  6. Anticipate   7. Immunize for rubella pp.      Edil Etienne DO  Dept of OB/GYN  2022    OB Brief Admit H&P     No significant change in general health status based on examination of the patient, review of Nursing Admission Database and prenatal record.     Pt is a 27 year old  @ 38w0d who presented to L&D with labor.     Patient's prenatal course has been complicated by FEI at 24 weeks, Rubella NI     Prenatal Labs:    Blood type O+  Rubella imm  GCT 87  GBS +     EFW: 8lbs     /66 (BP Location: Right arm, Patient Position: Semi-Be's, Cuff Size: Adult Regular)   Temp 98.9  F (37.2  C) (Oral)   Resp 16   Ht 1.676 m (5' 6\")   Wt 78.9 kg (174 lb)   LMP 2021   BMI 28.08 kg/m    EFM:  130 Mod V + accels no decels  Botines: q2min  SVE: 3/90%/-2  Membranes:  intact     ASSESSMENT/PLAN:  Nissa Merida  is a 27 year old   At 38w0d by LMP who presents for labor.  1.  Admit to L&D  2. Labor sponmtaneous  3.  Fetal status is " Category 1 with accelerations  4.  Pain management: prn  5.  GBS +, start pcn per protocol  6. Anticipate   7. Immunize for rubella pp.      Edil Etienne DO  Dept of OB/GYN  2022

## 2022-08-01 NOTE — PROVIDER NOTIFICATION
07/31/22 2028   Provider Notification   Provider Name/Title ROHIT Hoskins   Method of Notification At Bedside   Request Evaluate in Person   Notification Reason Labor Status;Uterine Activity;Pain;Membrane Status;SVE;Status Update;Lab/Diagnostic Study;Other (Comment)     ROHIT Hoskins at bedside evaluating FHR strip, ROM Plus and pt. Per ROHIT, order atarax 100mg and pt to discharge to self care with labor precautions.

## 2022-08-01 NOTE — PROVIDER NOTIFICATION
08/01/22 1120   Provider Notification   Provider Name/Title Dr. Etienne   Method of Notification Phone   Request Evaluate - Remote   Notification Reason Labor Status     MD updated on labor status.  Contractions every 2-6 minutes.  Patient is able to talk through them and rest well.  She feels like they have decreased a lot in intensity from this morning.  FHT category 1.  MD will be over to discuss options with patient around noon.

## 2022-08-01 NOTE — PROVIDER NOTIFICATION
08/01/22 0435   Provider Notification   Provider Name/Title ROHIT Hernandez   Method of Notification Phone   Request Evaluate - Remote   Notification Reason Status Update     CNM talked with the patient and the patient does not want to go home.  Orders for fentanyl and to keep the patient ambulating until she is ready for an epidural.

## 2022-08-01 NOTE — ANESTHESIA PREPROCEDURE EVALUATION
Anesthesia Pre-Procedure Evaluation    Patient: Nissa Merida   MRN: [id number] : 1995        Procedure : * No procedures listed *          Past Medical History:   Diagnosis Date     Anxiety      Asthma      Heart murmur     as a baby     Uncomplicated asthma       History reviewed. No pertinent surgical history.   No Known Allergies   Social History     Tobacco Use     Smoking status: Never Smoker     Smokeless tobacco: Never Used   Substance Use Topics     Alcohol use: Not Currently      Wt Readings from Last 1 Encounters:   22 78.9 kg (174 lb)        Anesthesia Evaluation            ROS/MED HX  ENT/Pulmonary:  - neg pulmonary ROS   (+) asthma     Neurologic:  - neg neurologic ROS     Cardiovascular:  - neg cardiovascular ROS     METS/Exercise Tolerance:     Hematologic:  - neg hematologic  ROS     Musculoskeletal:  - neg musculoskeletal ROS     GI/Hepatic:     (+) GERD,     Renal/Genitourinary:  - neg Renal ROS     Endo:  - neg endo ROS     Psychiatric/Substance Use:  - neg psychiatric ROS     Infectious Disease:  - neg infectious disease ROS     Malignancy:  - neg malignancy ROS     Other:  - neg other ROS          Physical Exam    Airway        Mallampati: II       Respiratory Devices and Support         Dental           Cardiovascular   cardiovascular exam normal          Pulmonary   pulmonary exam normal                OUTSIDE LABS:  CBC:   Lab Results   Component Value Date    WBC 10.7 2022    HGB 11.2 (L) 2022    HCT 32.4 (L) 2022     2022     BMP: No results found for: NA, POTASSIUM, CHLORIDE, CO2, BUN, CR, GLC  COAGS: No results found for: PTT, INR, FIBR  POC: No results found for: BGM, HCG, HCGS  HEPATIC: No results found for: ALBUMIN, PROTTOTAL, ALT, AST, GGT, ALKPHOS, BILITOTAL, BILIDIRECT, RENARD  OTHER: No results found for: PH, LACT, A1C, TRINO, PHOS, MAG, LIPASE, AMYLASE, TSH, T4, T3, CRP, SED    Anesthesia Plan    ASA Status:  2, emergent    NPO Status:  NPO  Appropriate    Anesthesia Type: Epidural.              Consents    Anesthesia Plan(s) and associated risks, benefits, and realistic alternatives discussed. Questions answered and patient/representative(s) expressed understanding.    - Discussed:     - Discussed with:  Patient      - Extended Intubation/Ventilatory Support Discussed: No.      - Patient is DNR/DNI Status: No    Use of blood products discussed: No .     Postoperative Care    Pain management: IV analgesics, Oral pain medications, intrathecal morphine, Neuraxial analgesia, Multi-modal analgesia.   PONV prophylaxis: Ondansetron (or other 5HT-3), Background Propofol Infusion     Comments:                Marco Alex MD

## 2022-08-01 NOTE — PLAN OF CARE
Pt and mother Geni agree with POC. Pt given Atarax, mode of transportation [employer]. Pt discharging to self care in stable condition and ambulatory. Labor precautions discussed, pt verbalized understanding and teachback performed. Questions encouraged and answered.

## 2022-08-01 NOTE — PROVIDER NOTIFICATION
08/01/22 0736   Provider Notification   Provider Name/Title Dr. Springer   Method of Notification Phone   Request Evaluate - Remote   Notification Reason Labor Status;ROSS MARTINEZ updated on patient status.  She was 1-2 cm on arrival and is now 3 cm.  She was given morphine and vistaril and was able to rest for a couple of hours earlier this morning.  EVERETTE BEE saw patient until 0700 and now it is [facility].  MD is not rounding today, but telephone orders to admit patient to labor and delivery.

## 2022-08-01 NOTE — PROGRESS NOTES
Progress note.      Discussed labor course, contractions getting stronger.  Options given  CVX /-2   membranes intact  FHT: 140 mod V + accels no decels  A/P  38w0d with active labor    Will get epidural and then AROM in preparation for her  getting here  Baby cat 1    Edil Etienne, DO  2022  [phone]

## 2022-08-01 NOTE — PROVIDER NOTIFICATION
08/01/22 1750   Provider Notification   Provider Name/Title Dr. Springer   Method of Notification Phone   Request Evaluate - Remote   Notification Reason Decels;Uterine Activity;SVE     MD updated on prolonged decel from 7771-0896.  Patient was due for position change from right to left side at 1735 prolonged decel.  Patient repositioned back to right side and fluid bolus started at 1743.  FHT recovered 155, moderate variability, no accels, no decels.  Patient cyrus regularly, palpating moderate.  SVE: 5.5.  No new orders, will continue to monitor and update as necessary.

## 2022-08-01 NOTE — PROVIDER NOTIFICATION
08/01/22 0430   Provider Notification   Provider Name/Title ROHIT Hernandez   Method of Notification Phone   Request Evaluate - Remote   Notification Reason Labor Status;Uterine Activity;Pain;SVE;Status Update     CNM updated on patient arrival.  SVE: 1-2 cm, 80%, -2 station.  Patient is very uncomfortable and says that her contractions have gotten much worse since she left [facility] earlier.  She is cyrus about every 3 minutes, palpating mild/moderate.  FHT category 1.  CMM will call patient on her cell phone and discuss options and call the unit back.      ROHIT was aware that the patient was coming in and had seen her 4 hours prior at [facility].

## 2022-08-01 NOTE — PROVIDER NOTIFICATION
08/01/22 0815   Provider Notification   Provider Name/Title Dr. Etienne   Method of Notification Phone   Request Evaluate in Person   Notification Reason Labor Status     MD at the bedside discussing the plan of care with the patient and her .  Orders to start the penicillin right away.  Patient would like to eat something light for breakfast and then possibly get her epidural when she is a little more uncomfortable.  Will update MD as necessary.

## 2022-08-02 PROBLEM — Z98.890 POST-OPERATIVE STATE: Status: ACTIVE | Noted: 2022-08-02

## 2022-08-02 LAB
BASOPHILS # BLD AUTO: 0 10E3/UL (ref 0–0.2)
BASOPHILS NFR BLD AUTO: 0 %
EOSINOPHIL # BLD AUTO: 0 10E3/UL (ref 0–0.7)
EOSINOPHIL NFR BLD AUTO: 0 %
ERYTHROCYTE [DISTWIDTH] IN BLOOD BY AUTOMATED COUNT: 12 % (ref 10–15)
HCT VFR BLD AUTO: 35.4 % (ref 35–47)
HGB BLD-MCNC: 11.9 G/DL (ref 11.7–15.7)
IMM GRANULOCYTES # BLD: 0.1 10E3/UL
IMM GRANULOCYTES NFR BLD: 0 %
LYMPHOCYTES # BLD AUTO: 1.3 10E3/UL (ref 0.8–5.3)
LYMPHOCYTES NFR BLD AUTO: 9 %
MCH RBC QN AUTO: 33 PG (ref 26.5–33)
MCHC RBC AUTO-ENTMCNC: 33.6 G/DL (ref 31.5–36.5)
MCV RBC AUTO: 98 FL (ref 78–100)
MONOCYTES # BLD AUTO: 1.3 10E3/UL (ref 0–1.3)
MONOCYTES NFR BLD AUTO: 8 %
NEUTROPHILS # BLD AUTO: 13 10E3/UL (ref 1.6–8.3)
NEUTROPHILS NFR BLD AUTO: 83 %
NRBC # BLD AUTO: 0 10E3/UL
NRBC BLD AUTO-RTO: 0 /100
PLATELET # BLD AUTO: 214 10E3/UL (ref 150–450)
RBC # BLD AUTO: 3.61 10E6/UL (ref 3.8–5.2)
WBC # BLD AUTO: 15.7 10E3/UL (ref 4–11)
WBC # BLD AUTO: 15.7 10E3/UL (ref 4–11)

## 2022-08-02 PROCEDURE — 272N000001 HC OR GENERAL SUPPLY STERILE: Performed by: OBSTETRICS & GYNECOLOGY

## 2022-08-02 PROCEDURE — 85048 AUTOMATED LEUKOCYTE COUNT: CPT | Performed by: OBSTETRICS & GYNECOLOGY

## 2022-08-02 PROCEDURE — 360N000076 HC SURGERY LEVEL 3, PER MIN: Performed by: OBSTETRICS & GYNECOLOGY

## 2022-08-02 PROCEDURE — 36415 COLL VENOUS BLD VENIPUNCTURE: CPT | Performed by: OBSTETRICS & GYNECOLOGY

## 2022-08-02 PROCEDURE — 120N000001 HC R&B MED SURG/OB

## 2022-08-02 PROCEDURE — 250N000011 HC RX IP 250 OP 636: Performed by: NURSE ANESTHETIST, CERTIFIED REGISTERED

## 2022-08-02 PROCEDURE — 370N000017 HC ANESTHESIA TECHNICAL FEE, PER MIN: Performed by: OBSTETRICS & GYNECOLOGY

## 2022-08-02 PROCEDURE — 250N000011 HC RX IP 250 OP 636: Performed by: OBSTETRICS & GYNECOLOGY

## 2022-08-02 PROCEDURE — 999N000079 HC STATISTIC IP LACTATION SERVICES 1-15 MIN

## 2022-08-02 PROCEDURE — 250N000013 HC RX MED GY IP 250 OP 250 PS 637: Performed by: OBSTETRICS & GYNECOLOGY

## 2022-08-02 PROCEDURE — 258N000003 HC RX IP 258 OP 636: Performed by: OBSTETRICS & GYNECOLOGY

## 2022-08-02 PROCEDURE — 250N000009 HC RX 250: Performed by: OBSTETRICS & GYNECOLOGY

## 2022-08-02 PROCEDURE — 88307 TISSUE EXAM BY PATHOLOGIST: CPT | Mod: TC | Performed by: OBSTETRICS & GYNECOLOGY

## 2022-08-02 PROCEDURE — 250N000009 HC RX 250: Performed by: NURSE ANESTHETIST, CERTIFIED REGISTERED

## 2022-08-02 PROCEDURE — 710N000009 HC RECOVERY PHASE 1, LEVEL 1, PER MIN: Performed by: OBSTETRICS & GYNECOLOGY

## 2022-08-02 RX ORDER — METHYLERGONOVINE MALEATE 0.2 MG/ML
200 INJECTION INTRAVENOUS
Status: DISCONTINUED | OUTPATIENT
Start: 2022-08-02 | End: 2022-08-05 | Stop reason: HOSPADM

## 2022-08-02 RX ORDER — AZITHROMYCIN 500 MG/5ML
500 INJECTION, POWDER, LYOPHILIZED, FOR SOLUTION INTRAVENOUS
Status: COMPLETED | OUTPATIENT
Start: 2022-08-02 | End: 2022-08-02

## 2022-08-02 RX ORDER — PHENYLEPHRINE HYDROCHLORIDE 10 MG/ML
INJECTION INTRAVENOUS PRN
Status: DISCONTINUED | OUTPATIENT
Start: 2022-08-02 | End: 2022-08-02

## 2022-08-02 RX ORDER — CEFAZOLIN SODIUM 2 G/100ML
2 INJECTION, SOLUTION INTRAVENOUS EVERY 8 HOURS
Status: COMPLETED | OUTPATIENT
Start: 2022-08-02 | End: 2022-08-02

## 2022-08-02 RX ORDER — OXYCODONE HYDROCHLORIDE 5 MG/1
5 TABLET ORAL EVERY 4 HOURS PRN
Status: DISCONTINUED | OUTPATIENT
Start: 2022-08-02 | End: 2022-08-05 | Stop reason: HOSPADM

## 2022-08-02 RX ORDER — METHYLERGONOVINE MALEATE 0.2 MG/ML
200 INJECTION INTRAVENOUS
Status: DISCONTINUED | OUTPATIENT
Start: 2022-08-02 | End: 2022-08-02 | Stop reason: HOSPADM

## 2022-08-02 RX ORDER — NALOXONE HYDROCHLORIDE 0.4 MG/ML
0.4 INJECTION, SOLUTION INTRAMUSCULAR; INTRAVENOUS; SUBCUTANEOUS
Status: DISCONTINUED | OUTPATIENT
Start: 2022-08-02 | End: 2022-08-05 | Stop reason: HOSPADM

## 2022-08-02 RX ORDER — OXYTOCIN/0.9 % SODIUM CHLORIDE 30/500 ML
PLASTIC BAG, INJECTION (ML) INTRAVENOUS PRN
Status: DISCONTINUED | OUTPATIENT
Start: 2022-08-02 | End: 2022-08-02

## 2022-08-02 RX ORDER — MISOPROSTOL 200 UG/1
800 TABLET ORAL
Status: DISCONTINUED | OUTPATIENT
Start: 2022-08-02 | End: 2022-08-05 | Stop reason: HOSPADM

## 2022-08-02 RX ORDER — PROCHLORPERAZINE MALEATE 10 MG
10 TABLET ORAL EVERY 6 HOURS PRN
Status: DISCONTINUED | OUTPATIENT
Start: 2022-08-02 | End: 2022-08-05 | Stop reason: HOSPADM

## 2022-08-02 RX ORDER — METOCLOPRAMIDE HYDROCHLORIDE 5 MG/ML
10 INJECTION INTRAMUSCULAR; INTRAVENOUS EVERY 6 HOURS PRN
Status: DISCONTINUED | OUTPATIENT
Start: 2022-08-02 | End: 2022-08-05 | Stop reason: HOSPADM

## 2022-08-02 RX ORDER — OXYTOCIN 10 [USP'U]/ML
10 INJECTION, SOLUTION INTRAMUSCULAR; INTRAVENOUS
Status: DISCONTINUED | OUTPATIENT
Start: 2022-08-02 | End: 2022-08-05 | Stop reason: HOSPADM

## 2022-08-02 RX ORDER — HYDRALAZINE HYDROCHLORIDE 20 MG/ML
2.5-5 INJECTION INTRAMUSCULAR; INTRAVENOUS EVERY 10 MIN PRN
Status: DISCONTINUED | OUTPATIENT
Start: 2022-08-02 | End: 2022-08-02 | Stop reason: HOSPADM

## 2022-08-02 RX ORDER — NALOXONE HYDROCHLORIDE 0.4 MG/ML
0.2 INJECTION, SOLUTION INTRAMUSCULAR; INTRAVENOUS; SUBCUTANEOUS
Status: DISCONTINUED | OUTPATIENT
Start: 2022-08-02 | End: 2022-08-05 | Stop reason: HOSPADM

## 2022-08-02 RX ORDER — OXYTOCIN/0.9 % SODIUM CHLORIDE 30/500 ML
340 PLASTIC BAG, INJECTION (ML) INTRAVENOUS CONTINUOUS PRN
Status: DISCONTINUED | OUTPATIENT
Start: 2022-08-02 | End: 2022-08-02 | Stop reason: HOSPADM

## 2022-08-02 RX ORDER — ONDANSETRON 2 MG/ML
INJECTION INTRAMUSCULAR; INTRAVENOUS PRN
Status: DISCONTINUED | OUTPATIENT
Start: 2022-08-02 | End: 2022-08-02

## 2022-08-02 RX ORDER — ONDANSETRON 2 MG/ML
4 INJECTION INTRAMUSCULAR; INTRAVENOUS EVERY 4 HOURS PRN
Status: DISCONTINUED | OUTPATIENT
Start: 2022-08-02 | End: 2022-08-05 | Stop reason: HOSPADM

## 2022-08-02 RX ORDER — LIDOCAINE HCL/EPINEPHRINE/PF 2%-1:200K
VIAL (ML) INJECTION PRN
Status: DISCONTINUED | OUTPATIENT
Start: 2022-08-02 | End: 2022-08-02

## 2022-08-02 RX ORDER — METOCLOPRAMIDE 10 MG/1
10 TABLET ORAL EVERY 6 HOURS PRN
Status: DISCONTINUED | OUTPATIENT
Start: 2022-08-02 | End: 2022-08-05 | Stop reason: HOSPADM

## 2022-08-02 RX ORDER — OXYTOCIN/0.9 % SODIUM CHLORIDE 30/500 ML
340 PLASTIC BAG, INJECTION (ML) INTRAVENOUS CONTINUOUS PRN
Status: DISCONTINUED | OUTPATIENT
Start: 2022-08-02 | End: 2022-08-05 | Stop reason: HOSPADM

## 2022-08-02 RX ORDER — OXYTOCIN 10 [USP'U]/ML
10 INJECTION, SOLUTION INTRAMUSCULAR; INTRAVENOUS
Status: DISCONTINUED | OUTPATIENT
Start: 2022-08-02 | End: 2022-08-02 | Stop reason: HOSPADM

## 2022-08-02 RX ORDER — MODIFIED LANOLIN
OINTMENT (GRAM) TOPICAL
Status: DISCONTINUED | OUTPATIENT
Start: 2022-08-02 | End: 2022-08-05 | Stop reason: HOSPADM

## 2022-08-02 RX ORDER — HYDROMORPHONE HCL IN WATER/PF 6 MG/30 ML
0.4 PATIENT CONTROLLED ANALGESIA SYRINGE INTRAVENOUS EVERY 5 MIN PRN
Status: DISCONTINUED | OUTPATIENT
Start: 2022-08-02 | End: 2022-08-02 | Stop reason: HOSPADM

## 2022-08-02 RX ORDER — MISOPROSTOL 200 UG/1
400 TABLET ORAL
Status: DISCONTINUED | OUTPATIENT
Start: 2022-08-02 | End: 2022-08-02 | Stop reason: HOSPADM

## 2022-08-02 RX ORDER — OXYTOCIN/0.9 % SODIUM CHLORIDE 30/500 ML
100-340 PLASTIC BAG, INJECTION (ML) INTRAVENOUS CONTINUOUS PRN
Status: DISCONTINUED | OUTPATIENT
Start: 2022-08-02 | End: 2022-08-05 | Stop reason: HOSPADM

## 2022-08-02 RX ORDER — ONDANSETRON 4 MG/1
4 TABLET, ORALLY DISINTEGRATING ORAL EVERY 30 MIN PRN
Status: DISCONTINUED | OUTPATIENT
Start: 2022-08-02 | End: 2022-08-05 | Stop reason: HOSPADM

## 2022-08-02 RX ORDER — AMOXICILLIN 250 MG
1 CAPSULE ORAL 2 TIMES DAILY
Status: DISCONTINUED | OUTPATIENT
Start: 2022-08-02 | End: 2022-08-05 | Stop reason: HOSPADM

## 2022-08-02 RX ORDER — OXYTOCIN/0.9 % SODIUM CHLORIDE 30/500 ML
1-24 PLASTIC BAG, INJECTION (ML) INTRAVENOUS CONTINUOUS
Status: DISCONTINUED | OUTPATIENT
Start: 2022-08-02 | End: 2022-08-02 | Stop reason: HOSPADM

## 2022-08-02 RX ORDER — CARBOPROST TROMETHAMINE 250 UG/ML
250 INJECTION, SOLUTION INTRAMUSCULAR
Status: DISCONTINUED | OUTPATIENT
Start: 2022-08-02 | End: 2022-08-05 | Stop reason: HOSPADM

## 2022-08-02 RX ORDER — MEPERIDINE HYDROCHLORIDE 25 MG/ML
12.5 INJECTION INTRAMUSCULAR; INTRAVENOUS; SUBCUTANEOUS
Status: DISCONTINUED | OUTPATIENT
Start: 2022-08-02 | End: 2022-08-02

## 2022-08-02 RX ORDER — ACETAMINOPHEN 325 MG/1
975 TABLET ORAL EVERY 6 HOURS
Status: DISCONTINUED | OUTPATIENT
Start: 2022-08-02 | End: 2022-08-05 | Stop reason: HOSPADM

## 2022-08-02 RX ORDER — SODIUM CHLORIDE, SODIUM LACTATE, POTASSIUM CHLORIDE, CALCIUM CHLORIDE 600; 310; 30; 20 MG/100ML; MG/100ML; MG/100ML; MG/100ML
INJECTION, SOLUTION INTRAVENOUS CONTINUOUS PRN
Status: DISCONTINUED | OUTPATIENT
Start: 2022-08-02 | End: 2022-08-02 | Stop reason: HOSPADM

## 2022-08-02 RX ORDER — LIDOCAINE 40 MG/G
CREAM TOPICAL
Status: DISCONTINUED | OUTPATIENT
Start: 2022-08-02 | End: 2022-08-05 | Stop reason: HOSPADM

## 2022-08-02 RX ORDER — NALBUPHINE HYDROCHLORIDE 10 MG/ML
2.5-5 INJECTION, SOLUTION INTRAMUSCULAR; INTRAVENOUS; SUBCUTANEOUS EVERY 6 HOURS PRN
Status: DISCONTINUED | OUTPATIENT
Start: 2022-08-02 | End: 2022-08-02

## 2022-08-02 RX ORDER — CEFAZOLIN SODIUM 1 G/3ML
INJECTION, POWDER, FOR SOLUTION INTRAMUSCULAR; INTRAVENOUS PRN
Status: DISCONTINUED | OUTPATIENT
Start: 2022-08-02 | End: 2022-08-02

## 2022-08-02 RX ORDER — ONDANSETRON 4 MG/1
4 TABLET, ORALLY DISINTEGRATING ORAL EVERY 6 HOURS PRN
Status: DISCONTINUED | OUTPATIENT
Start: 2022-08-02 | End: 2022-08-05 | Stop reason: HOSPADM

## 2022-08-02 RX ORDER — ONDANSETRON 2 MG/ML
4 INJECTION INTRAMUSCULAR; INTRAVENOUS EVERY 6 HOURS PRN
Status: DISCONTINUED | OUTPATIENT
Start: 2022-08-02 | End: 2022-08-05 | Stop reason: HOSPADM

## 2022-08-02 RX ORDER — PROCHLORPERAZINE 25 MG
25 SUPPOSITORY, RECTAL RECTAL EVERY 12 HOURS PRN
Status: DISCONTINUED | OUTPATIENT
Start: 2022-08-02 | End: 2022-08-05 | Stop reason: HOSPADM

## 2022-08-02 RX ORDER — MISOPROSTOL 200 UG/1
800 TABLET ORAL
Status: DISCONTINUED | OUTPATIENT
Start: 2022-08-02 | End: 2022-08-02 | Stop reason: HOSPADM

## 2022-08-02 RX ORDER — CEFAZOLIN SODIUM/WATER 2 G/20 ML
2 SYRINGE (ML) INTRAVENOUS
Status: DISCONTINUED | OUTPATIENT
Start: 2022-08-02 | End: 2022-08-02 | Stop reason: HOSPADM

## 2022-08-02 RX ORDER — CARBOPROST TROMETHAMINE 250 UG/ML
250 INJECTION, SOLUTION INTRAMUSCULAR
Status: DISCONTINUED | OUTPATIENT
Start: 2022-08-02 | End: 2022-08-02 | Stop reason: HOSPADM

## 2022-08-02 RX ORDER — ONDANSETRON 2 MG/ML
4 INJECTION INTRAMUSCULAR; INTRAVENOUS EVERY 30 MIN PRN
Status: DISCONTINUED | OUTPATIENT
Start: 2022-08-02 | End: 2022-08-05 | Stop reason: HOSPADM

## 2022-08-02 RX ORDER — METOPROLOL TARTRATE 1 MG/ML
1-2 INJECTION, SOLUTION INTRAVENOUS EVERY 5 MIN PRN
Status: DISCONTINUED | OUTPATIENT
Start: 2022-08-02 | End: 2022-08-02 | Stop reason: HOSPADM

## 2022-08-02 RX ORDER — MISOPROSTOL 200 UG/1
400 TABLET ORAL
Status: DISCONTINUED | OUTPATIENT
Start: 2022-08-02 | End: 2022-08-05 | Stop reason: HOSPADM

## 2022-08-02 RX ORDER — MAGNESIUM HYDROXIDE 1200 MG/15ML
LIQUID ORAL PRN
Status: DISCONTINUED | OUTPATIENT
Start: 2022-08-02 | End: 2022-08-02

## 2022-08-02 RX ORDER — KETOROLAC TROMETHAMINE 30 MG/ML
30 INJECTION, SOLUTION INTRAMUSCULAR; INTRAVENOUS EVERY 6 HOURS
Status: COMPLETED | OUTPATIENT
Start: 2022-08-02 | End: 2022-08-02

## 2022-08-02 RX ORDER — SODIUM CHLORIDE, SODIUM LACTATE, POTASSIUM CHLORIDE, CALCIUM CHLORIDE 600; 310; 30; 20 MG/100ML; MG/100ML; MG/100ML; MG/100ML
INJECTION, SOLUTION INTRAVENOUS CONTINUOUS
Status: DISCONTINUED | OUTPATIENT
Start: 2022-08-02 | End: 2022-08-02 | Stop reason: HOSPADM

## 2022-08-02 RX ORDER — KETOROLAC TROMETHAMINE 15 MG/ML
15 INJECTION, SOLUTION INTRAMUSCULAR; INTRAVENOUS EVERY 6 HOURS PRN
Status: DISCONTINUED | OUTPATIENT
Start: 2022-08-02 | End: 2022-08-02

## 2022-08-02 RX ORDER — TRANEXAMIC ACID 10 MG/ML
1 INJECTION, SOLUTION INTRAVENOUS EVERY 30 MIN PRN
Status: DISCONTINUED | OUTPATIENT
Start: 2022-08-02 | End: 2022-08-02 | Stop reason: HOSPADM

## 2022-08-02 RX ORDER — LIDOCAINE 40 MG/G
CREAM TOPICAL
Status: DISCONTINUED | OUTPATIENT
Start: 2022-08-02 | End: 2022-08-02 | Stop reason: HOSPADM

## 2022-08-02 RX ORDER — AMOXICILLIN 250 MG
2 CAPSULE ORAL 2 TIMES DAILY
Status: DISCONTINUED | OUTPATIENT
Start: 2022-08-02 | End: 2022-08-05 | Stop reason: HOSPADM

## 2022-08-02 RX ORDER — DEXTROSE, SODIUM CHLORIDE, SODIUM LACTATE, POTASSIUM CHLORIDE, AND CALCIUM CHLORIDE 5; .6; .31; .03; .02 G/100ML; G/100ML; G/100ML; G/100ML; G/100ML
INJECTION, SOLUTION INTRAVENOUS CONTINUOUS
Status: DISCONTINUED | OUTPATIENT
Start: 2022-08-02 | End: 2022-08-05 | Stop reason: HOSPADM

## 2022-08-02 RX ORDER — CEFAZOLIN SODIUM/WATER 2 G/20 ML
2 SYRINGE (ML) INTRAVENOUS SEE ADMIN INSTRUCTIONS
Status: DISCONTINUED | OUTPATIENT
Start: 2022-08-02 | End: 2022-08-02 | Stop reason: HOSPADM

## 2022-08-02 RX ORDER — SIMETHICONE 80 MG
80 TABLET,CHEWABLE ORAL 4 TIMES DAILY PRN
Status: DISCONTINUED | OUTPATIENT
Start: 2022-08-02 | End: 2022-08-05 | Stop reason: HOSPADM

## 2022-08-02 RX ORDER — OXYCODONE HYDROCHLORIDE 5 MG/1
5 TABLET ORAL EVERY 4 HOURS PRN
Status: DISCONTINUED | OUTPATIENT
Start: 2022-08-02 | End: 2022-08-04

## 2022-08-02 RX ORDER — FENTANYL CITRATE 50 UG/ML
50 INJECTION, SOLUTION INTRAMUSCULAR; INTRAVENOUS
Status: DISCONTINUED | OUTPATIENT
Start: 2022-08-02 | End: 2022-08-05 | Stop reason: HOSPADM

## 2022-08-02 RX ORDER — CITRIC ACID/SODIUM CITRATE 334-500MG
30 SOLUTION, ORAL ORAL
Status: COMPLETED | OUTPATIENT
Start: 2022-08-02 | End: 2022-08-02

## 2022-08-02 RX ORDER — IBUPROFEN 800 MG/1
800 TABLET, FILM COATED ORAL EVERY 6 HOURS
Status: DISCONTINUED | OUTPATIENT
Start: 2022-08-03 | End: 2022-08-05 | Stop reason: HOSPADM

## 2022-08-02 RX ORDER — FENTANYL CITRATE 50 UG/ML
50 INJECTION, SOLUTION INTRAMUSCULAR; INTRAVENOUS EVERY 5 MIN PRN
Status: DISCONTINUED | OUTPATIENT
Start: 2022-08-02 | End: 2022-08-02 | Stop reason: HOSPADM

## 2022-08-02 RX ORDER — HYDROCORTISONE 25 MG/G
CREAM TOPICAL 3 TIMES DAILY PRN
Status: DISCONTINUED | OUTPATIENT
Start: 2022-08-02 | End: 2022-08-05 | Stop reason: HOSPADM

## 2022-08-02 RX ORDER — MORPHINE SULFATE 1 MG/ML
INJECTION, SOLUTION EPIDURAL; INTRATHECAL; INTRAVENOUS PRN
Status: DISCONTINUED | OUTPATIENT
Start: 2022-08-02 | End: 2022-08-02

## 2022-08-02 RX ORDER — SODIUM CHLORIDE, SODIUM LACTATE, POTASSIUM CHLORIDE, CALCIUM CHLORIDE 600; 310; 30; 20 MG/100ML; MG/100ML; MG/100ML; MG/100ML
INJECTION, SOLUTION INTRAVENOUS CONTINUOUS
Status: DISCONTINUED | OUTPATIENT
Start: 2022-08-02 | End: 2022-08-02

## 2022-08-02 RX ORDER — TRANEXAMIC ACID 10 MG/ML
1 INJECTION, SOLUTION INTRAVENOUS EVERY 30 MIN PRN
Status: DISCONTINUED | OUTPATIENT
Start: 2022-08-02 | End: 2022-08-05 | Stop reason: HOSPADM

## 2022-08-02 RX ORDER — BISACODYL 10 MG
10 SUPPOSITORY, RECTAL RECTAL DAILY PRN
Status: DISCONTINUED | OUTPATIENT
Start: 2022-08-04 | End: 2022-08-05 | Stop reason: HOSPADM

## 2022-08-02 RX ADMIN — METHYLERGONOVINE MALEATE 200 MCG: 0.2 INJECTION INTRAVENOUS at 03:49

## 2022-08-02 RX ADMIN — ACETAMINOPHEN 975 MG: 325 TABLET, FILM COATED ORAL at 18:35

## 2022-08-02 RX ADMIN — ACETAMINOPHEN 650 MG: 325 TABLET ORAL at 00:50

## 2022-08-02 RX ADMIN — KETOROLAC TROMETHAMINE 30 MG: 30 INJECTION, SOLUTION INTRAMUSCULAR; INTRAVENOUS at 23:59

## 2022-08-02 RX ADMIN — SENNOSIDES AND DOCUSATE SODIUM 2 TABLET: 50; 8.6 TABLET ORAL at 20:29

## 2022-08-02 RX ADMIN — MORPHINE SULFATE 3 MG: 1 INJECTION EPIDURAL; INTRATHECAL; INTRAVENOUS at 03:47

## 2022-08-02 RX ADMIN — ACETAMINOPHEN 975 MG: 325 TABLET, FILM COATED ORAL at 11:42

## 2022-08-02 RX ADMIN — SODIUM CHLORIDE, POTASSIUM CHLORIDE, SODIUM LACTATE AND CALCIUM CHLORIDE: 600; 310; 30; 20 INJECTION, SOLUTION INTRAVENOUS at 02:13

## 2022-08-02 RX ADMIN — ONDANSETRON 4 MG: 2 INJECTION INTRAMUSCULAR; INTRAVENOUS at 03:46

## 2022-08-02 RX ADMIN — PHENYLEPHRINE HYDROCHLORIDE 150 MCG: 10 INJECTION INTRAVENOUS at 03:40

## 2022-08-02 RX ADMIN — PHENYLEPHRINE HYDROCHLORIDE 200 MCG: 10 INJECTION INTRAVENOUS at 04:01

## 2022-08-02 RX ADMIN — Medication 500 MG: at 03:26

## 2022-08-02 RX ADMIN — LIDOCAINE HYDROCHLORIDE,EPINEPHRINE BITARTRATE 10 ML: 20; .005 INJECTION, SOLUTION EPIDURAL; INFILTRATION; INTRACAUDAL; PERINEURAL at 03:24

## 2022-08-02 RX ADMIN — PHENYLEPHRINE HYDROCHLORIDE 150 MCG: 10 INJECTION INTRAVENOUS at 03:50

## 2022-08-02 RX ADMIN — OXYTOCIN-SODIUM CHLORIDE 0.9% IV SOLN 30 UNIT/500ML 500 ML: 30-0.9/5 SOLUTION at 03:46

## 2022-08-02 RX ADMIN — PHENYLEPHRINE HYDROCHLORIDE 200 MCG: 10 INJECTION INTRAVENOUS at 03:58

## 2022-08-02 RX ADMIN — SODIUM CHLORIDE, SODIUM LACTATE, POTASSIUM CHLORIDE, CALCIUM CHLORIDE AND DEXTROSE MONOHYDRATE: 5; 600; 310; 30; 20 INJECTION, SOLUTION INTRAVENOUS at 07:12

## 2022-08-02 RX ADMIN — ACETAMINOPHEN 975 MG: 325 TABLET, FILM COATED ORAL at 07:27

## 2022-08-02 RX ADMIN — CEFAZOLIN SODIUM 2 G: 2 INJECTION, SOLUTION INTRAVENOUS at 11:50

## 2022-08-02 RX ADMIN — KETOROLAC TROMETHAMINE 30 MG: 30 INJECTION, SOLUTION INTRAMUSCULAR; INTRAVENOUS at 11:45

## 2022-08-02 RX ADMIN — KETOROLAC TROMETHAMINE 30 MG: 30 INJECTION, SOLUTION INTRAMUSCULAR; INTRAVENOUS at 18:35

## 2022-08-02 RX ADMIN — SODIUM CITRATE AND CITRIC ACID MONOHYDRATE 30 ML: 500; 334 SOLUTION ORAL at 03:11

## 2022-08-02 RX ADMIN — CEFAZOLIN SODIUM 2 G: 2 INJECTION, SOLUTION INTRAVENOUS at 20:29

## 2022-08-02 RX ADMIN — Medication 1 MILLI-UNITS/MIN: at 01:21

## 2022-08-02 RX ADMIN — PENICILLIN G 3 MILLION UNITS: 3000000 INJECTION, SOLUTION INTRAVENOUS at 01:04

## 2022-08-02 RX ADMIN — CEFAZOLIN 2 G: 1 INJECTION, POWDER, FOR SOLUTION INTRAMUSCULAR; INTRAVENOUS at 03:23

## 2022-08-02 RX ADMIN — SENNOSIDES AND DOCUSATE SODIUM 2 TABLET: 50; 8.6 TABLET ORAL at 11:42

## 2022-08-02 RX ADMIN — LIDOCAINE HYDROCHLORIDE,EPINEPHRINE BITARTRATE 10 ML: 20; .005 INJECTION, SOLUTION EPIDURAL; INFILTRATION; INTRACAUDAL; PERINEURAL at 02:57

## 2022-08-02 ASSESSMENT — ACTIVITIES OF DAILY LIVING (ADL)
ADLS_ACUITY_SCORE: 22
ADLS_ACUITY_SCORE: 20
ADLS_ACUITY_SCORE: 22
ADLS_ACUITY_SCORE: 22
ADLS_ACUITY_SCORE: 20
ADLS_ACUITY_SCORE: 20
ADLS_ACUITY_SCORE: 22
ADLS_ACUITY_SCORE: 22
ADLS_ACUITY_SCORE: 20
ADLS_ACUITY_SCORE: 20

## 2022-08-02 NOTE — PLAN OF CARE
Data: Nissa Merida transferred to 454 via wheelchair at 0655.   Action: Receiving unit notified of transfer: Yes. Patient and family notified of room change. Report given to Ngozi ADDISON at 0720. Belongings sent to receiving unit. Accompanied by Registered Nurse. Oriented patient to surroundings. Call light within reach.  Response: Patient tolerated transfer and is stable.    Patients mobililty level scored using the bedside mobility assistance tool (BMAT). Patient is at a mobility level test number: 1. Mobility equipment used: hovermat. Required assist of 1 staff members. Further use of BMAT scoring required.

## 2022-08-02 NOTE — ANESTHESIA POSTPROCEDURE EVALUATION
Patient: Nissa Merida    Procedure: Procedure(s):   SECTION       Anesthesia Type:  Epidural    Note:  Disposition: Outpatient   Postop Pain Control: Uneventful            Sign Out: Well controlled pain   PONV: No   Neuro/Psych: Uneventful            Sign Out: Acceptable/Baseline neuro status   Airway/Respiratory: Uneventful            Sign Out: Acceptable/Baseline resp. status   CV/Hemodynamics: Uneventful            Sign Out: Acceptable CV status; No obvious hypovolemia; No obvious fluid overload   Other NRE: NONE   DID A NON-ROUTINE EVENT OCCUR? No           Last vitals:  Vitals Value Taken Time   /72 22 1130   Temp 98.2  F (36.8  C) 22 1130   Pulse 70 22 1130   Resp 20 22 1130   SpO2 96 % 22 1036       Electronically Signed By: Marco Alex MD  2022  5:51 PM

## 2022-08-02 NOTE — PROGRESS NOTES
"OB Progress Note  2022  12:45 AM    S:  Pt with satisfactory pain control.  No other complaints.      O:  /60   Temp 100.4  F (38  C) (Oral)   Resp 16   Ht 1.676 m (5' 6\")   Wt 78.9 kg (174 lb)   LMP 2021   BMI 28.08 kg/m    EFM: baseline 160, accelerations are present, non repetitive/occasional late decelerations, moderate variability; Category II but reassuring  [place]:  Ctx q2-4 min  IUPC shows 175 [place] units  SVE:  5-6/90%/-1  Membranes: AROM at 1600 hours today     No Pitocin currently    A/P:  27 year old  @38w1d admitted with spontaneous labor, augmented with AROM.  GBS positive, adequately treated.  Slow progress, cervix now 5-6 cm but swollen, vertex at -1.  Monitoring now for development of III (triple I). Inadequate labor with 175 MVU (ideally > or = 200 MVU).    1.  Tylenol for low grade maternal temperature  2.  Start Pitocin infusion   3.  If evidence of triple I, start Ampicillin and Gentamicin.   4.  If fetal intolerance of labor remote from delivery or insufficient progress despite adequate labor, proceed with  section.     Kishor Springer MD    "

## 2022-08-02 NOTE — PROVIDER NOTIFICATION
08/01/22 2050   Provider Notification   Provider Name/Title Dr. Springer   Method of Notification At Bedside     MD at bedside to discuss POC. Plan to recheck patient at 2200 and if no change, place IUPC and potentially pitocin. Patient and FOB agree with plan. Will continue to monitor.    21-May-2022 17:57

## 2022-08-02 NOTE — H&P
Admitted: 2022    PREOPERATIVE DIAGNOSIS:  Intrauterine pregnancy at 38 weeks 2 days gestation, arrest of dilation in active phase of labor, fetal status remote from delivery.    PROPOSED PROCEDURE:  Primary low segment transverse  section.     PATIENT IDENTIFICATION:  Nissa Merida is a 27-year-old patient is  2, para 0-0-1-0 at 38 weeks 0 days' gestation, admitted to Labor and Delivery at [facility] early in the morning of 2002 for evaluation of labor.  The patient had been seen at [facility] late in the evening on 2022. when it was apparent that she was not in active labor, the patient was discharged home only to return approximately 4 hours later with increasing contractions.  The patient's prenatal care was transferred to our office at 24 weeks.  She is originally [place].  She did receive comprehensive prenatal care prior to her transfer to our clinic and continued to have comprehensive care for the duration of the pregnancy.  Due date was confirmed by last menstrual period and by an ultrasound.  An anatomy ultrasound was normal, showing no evidence of placenta previa, normal fetal anatomy and normal amniotic fluid volume.  The patient's blood type is O positive, rubella titer showed nonimmunity, one-hour glucose screen was normal, and a group B strep culture at term was positive.  The patient was followed by the midwife program at [facility]. As noted above, she developed contractions on 2022, she was seen and evaluated at [facility] and subsequently discharged.  She returned several hours later, and was triaged to [facility] due to staffing issues.    HOSPITAL COURSE:  The patient was initially evaluated in the maternal assessment center at [facility] by 0400 hours on 2002.  She was cyrus every 3 minutes or so and they were palpating mild to moderate.  Fetal heart tones were  category 1.  The cervix was 1-2 cm, 80%, vertex -2.  The patient was counseled by the certified nurse midwife Vivi Benedict and the plan was to observe to recheck the cervix to see if the contractions were associated with cervical change.  The patient did some ambulating and she was subsequently treated with morphine and Vistaril for rest.  She was reexamined, she was reexamined later that morning at 0748 hours and was found to be 3 cm dilated.  The patient had got some rest from the morphine and Vistaril.  The patient was admitted, given the cervical change and was evaluated in the morning by Dr. Jaimie persaud.  The fetal heart tones remained category 1 with accelerations.  The patient was tolerating the contractions well and did not yet require pain medication.  Given her group B strep positive status, she was started on the penicillin G protocol.  The plan was to also immunize the patient with an MMR due to her rubella nonimmune status.  The patient was observed through the morning of 08/01/2022 and she was reevaluated by Dr. Etienne at 1244 hours. Contractions at that time, had increased, the cervix was 4 cm, 90%, vertex -2.  Membranes were intact.  Fetal heart tones were category 1 with accelerations and no decelerations.  The patient subsequently had an epidural placed at 1348 due to increased discomfort.  She was then again examined by Dr. Etienne at 1600 hours and amniotomy was performed.  The cervix remained at 4 cm.  The patient was comfortable with the epidural.  She continued to be observed throughout the afternoon of 08/01/2022.  By 1800 hours, the cervix was 5 cm dilated, 90% effaced, vertex -2.  Fetal heart tones remained category 1.  At 1750 hours there was a prolonged deceleration lasting several minutes that gradually recovered, returned to baseline with normal variability and accelerations.  The patient continued to contract every 2-4 minutes and they palpated, moderate to strong.  However, she  remained 5 cm for the next several hours.  In addition, during this time fetal heart tones were carefully monitored. The baseline remained in the 150s, moderate variability with periods of minimal variability.  Accelerations were present or absent and no significant decelerations were present.  The patient was rechecked by midnight and was 5-6 cm dilated.  Additionally, at this time, there was noted to be more frequent late decelerations, not repetitive, but present in approximately 50% of uterine contractions.  The fetal heart rate baseline also increased to 160.  The patient's temperature minimally increased to 100.2 and 100.4 with the highest temperature reading 100.5.  The patient's pulse was normal and the fetal heart rate was in the upper range of normal at 160.  There was no evidence of any foul-smelling amniotic fluid.  The patient was carefully monitored for any additional fever or any other signs of possible triple I infection.  Due to the lack of adequate change, an intrauterine pressure catheter was placed and did show that the [place] units were approximately 175.  This was slightly below the desired value of 200.  By 0134 hours, the fetal status remained satisfactory with minimal to moderate variability, occasional late decelerations, but in general reassuring.  The patient's temperature was very closely being watched and she was given Tylenol.  Intravenous oxytocin was initiated at 1 milliunit per minute in hopes of augmenting the labor.  However, by 0234 hours the baby showed more consistent category 2 tracing with minimal to moderate variability and ongoing late decelerations.  The patient was reexamined and she was unchanged at 5-6 cm.  I discussed with the patient and her partner as well as the patient's mother the clinical status and the recommendation to proceed with primary low segment transverse  section.  The indication for the procedure was arrest of dilation as well as fetal  wellbeing remote from delivery.  The patient was not diagnosed with chorioamnionitis or triple-I infection.  She had been adequately treated for her group B strep colonization.  I did discuss with the couple, and the patient's mother with the assistance of an , the recommendation for a  section.  Their questions were answered, and they expressed their understanding and wished to proceed.  Appropriate consent forms were appropriate consent forms were signed for a possible blood transfusion as well as for the primary  section. The patient had a functional epidural and this would be used and redosed for the surgery.  The intravenous Pitocin was discontinued.  The contractions began to space out and the fetal status remained satisfactory.  The variability just prior to proceeding with the  was moderate.  The baseline was in the 155-160 range.  There were no significant decelerations.    SUMMARY:  Intrauterine pregnancy at 38 weeks 1 day gestation, spontaneous labor, amniotomy and Pitocin augmentation, arrest of cervical dilation in the active phase of labor, fetal status, remote from delivery, low-grade maternal temperature without definitive diagnosis and criteria meeting for an triple I infection.    PLAN:  Primary low segment transverse  section  using the patient's epidural block.  The pros, cons, risks, benefits, and potential complications were discussed at some length with the patient and her .  They expressed understanding and agreement with the plan of care.  The rationale or the need for  section was discussed and the patient and her family expressed understanding.  During much of the discussion, the  was present by remote access, the clinical situation and the procedure was discussed at some length and we proceeded.  The patient did receive Ancef and azithromycin preoperatively.  She had also received several doses of penicillin G  for her group B strep colonization.    Kishor Springer MD        D: 2022   T: 2022   MT: [id number]    Name:     MELANY VEGA  MRN:      [id number]        Account:     [id number]   :      1995           Admitted:    2022       Document: [id number]    cc:  [facility]

## 2022-08-02 NOTE — PROGRESS NOTES
"OB Progress Note  2022  10:47 PM    S:  Pt with satisfactory pain control.  No other complaints.      O:  /71   Temp 100.2  F (37.9  C) (Oral)   Resp 16   Ht 1.676 m (5' 6\")   Wt 78.9 kg (174 lb)   LMP 2021   BMI 28.08 kg/m    EFM: baseline 125, accelerations are present, absent decelerations, moderate variability; Category I  [place]:  Ctx q2-4 min  SVE:  6/90%/-1  Membranes: AROM at 1600 hours today    Pitocin at 0 mU/min    ASSESSMENT/PLAN:  Nissa Merida  is a 27 year old   At 38w0d by LMP who presents for labor.  1. .  Pain management: epidural in place  2. Continue GBS prophylaxis   3. . Antcipate  unless fetal intolerance of labor or lack of progress.    7. Immunize for rubella pp.     Satisfactory fetal status, no significant cervical change on last few cervical checks. Question effectiveness of spontaneous labor--will place IU P to evaluate labor, start IV Pit to augment labor. Continue to monitor fetal status.  If lack of progress or fetal intolerance of labor, proceed to a  section.  All questions answered.       Kishor Springer MD    "

## 2022-08-02 NOTE — PROVIDER NOTIFICATION
22 0233   Provider Notification   Provider Name/Title Dr. Springer   Method of Notification At Bedside     Provider at bedside to discuss plan for . Consent signed.

## 2022-08-02 NOTE — LACTATION NOTE
Lactation in to see patient with Ngozi RN, interpreting for writer. Family has term infant in NICU. Started patient pumping. Educated on pumping every 3 hours, cleaning and care of pump parts. Knows lactation available.

## 2022-08-02 NOTE — PROGRESS NOTES
OB progress    Cervical exam at 0234 hours unchanged    FHR tracing remains category II with intermittent late decelerations and periods of minimal variability    Discussed again with the patient, her partner and mother.  Recommendation is to proceed with a primary low segment  section.  The indication is arrest of dilation and fetal status.  The couple agree with the plan of care.     Appropriate consent forms will be signed.  Questions answered and the virtual  was used.

## 2022-08-02 NOTE — ANESTHESIA POSTPROCEDURE EVALUATION
Patient: Nissa Merida    Procedure: Procedure(s):   SECTION       Anesthesia Type:  Epidural    Note:  Disposition: Inpatient   Postop Pain Control: Uneventful            Sign Out: Well controlled pain   PONV: No   Neuro/Psych: Uneventful            Sign Out: Acceptable/Baseline neuro status   Airway/Respiratory: Uneventful            Sign Out: Acceptable/Baseline resp. status   CV/Hemodynamics: Uneventful            Sign Out: Acceptable CV status; No obvious hypovolemia; No obvious fluid overload   Other NRE: NONE   DID A NON-ROUTINE EVENT OCCUR? No           Last vitals:  Vitals Value Taken Time   /79 22 0646   Temp 98.2  F (36.8  C) 22 0457   Pulse 83 22 0457   Resp 18 22 0600   SpO2 94 % 22 0651   Vitals shown include unvalidated device data.    Electronically Signed By: Akhil Jalloh MD  2022  6:53 AM

## 2022-08-02 NOTE — BRIEF OP NOTE
[facility] Brief Operative Note    Pre-operative diagnosis: Arrest of dilation, fetal status remote from delivery   Post-operative diagnosis same     Procedure: Procedure(s):   SECTION   Surgeon(s): Surgeon(s) and Role:     * Kishor Springer MD - Primary   Estimated blood loss: 880 ml    Specimens: ID Type Source Tests Collected by Time Destination   A :  Placenta Placenta SEE PROVIDERS ORDERS Kishor Springer MD 2022  4:01 AM    B :  Cord blood Umbilical Cord OR DOCUMENTATION ONLY Kishor Springer MD 2022  4:02 AM    C :  Tissue Umbilical Cord SEE PROVIDERS ORDERS Kishor Springer MD 2022  4:02 AM    D :  Cord blood, arterial Umbilical Cord OR DOCUMENTATION ONLY Kishor Springer MD 2022  4:02 AM    E :  Cord blood, venous Umbilical Cord OR DOCUMENTATION ONLY Kishor Springer MD 2022  4:02 AM       Findings: Clear amniotic fluid upon entering the uterine cavity, no foul odor, fluid not turbid.  Uterus and placenta normal body temperature.  Baby direct occiput posterior.  Delivered easily and atraumatically.  Infant male, 8# 2 oz Apgars 5,8.   team present for delivery.  No intraoperative complications.

## 2022-08-02 NOTE — PROVIDER NOTIFICATION
08/01/22 2045   Provider Notification   Provider Name/Title Dr. Springer   Method of Notification In Department     MD updated on patient status. SVE at 1930 was unchanged, despite patient feeling constant rectal pressure. Sesar regularly, q2-3.5 minutes apart palpating moderate to strong. FHR minimal to moderate with occasional accels and intermittent late decels. 3 minute prolonged during position change that was resolved once patient was back on her side. MD will go to bedside to discuss POC with patient.

## 2022-08-02 NOTE — PROVIDER NOTIFICATION
08/02/22 0113   Provider Notification   Provider Name/Title Dr. Springer   Method of Notification At Bedside     Provider at bedside, reviewed strip. Noted intermittent late decels, pt was repositioned and pitocin on hold to monitor FHR tracing. After monitoring and reviewing, MD gave order to start low dose pitocin.     Verbal orders given by provider to increase pitocin with minimal variability and no recurrent decels. Will continue monitoring maternal temperature and FHR baseline closely. Plan to recheck pt 1-2 hrs after pitocin start.

## 2022-08-02 NOTE — PROVIDER NOTIFICATION
08/02/22 1105   Provider Notification   Provider Name/Title Dr. Álvarez   Method of Notification Electronic Page   Request Evaluate-Remote   Notification Reason Vital Signs Change     FYI last temp was 100.8. Ancef dose due at 1130

## 2022-08-02 NOTE — PROGRESS NOTES
"OB Post-op  Section Progress Note POD# 0    S:  Patient doing well immediately post operatively. No current pain. Not yet OOB. Tolerating liquid diet.  Not hungry yet and no N/V.  Not yet passing flatus.  Plans to breastfeed - baby in NICU.     O:  /77 (BP Location: Left arm, Patient Position: Semi-Be's, Cuff Size: Adult Regular)   Pulse 92   Temp 99.1  F (37.3  C) (Oral)   Resp 18   Ht 1.676 m (5' 6\")   Wt 78.9 kg (174 lb)   LMP 2021   SpO2 97%   BMI 28.08 kg/m    UOP- 24hr 600, Since   Gen- A&O, NAD  Lungs- CTAB  CV-RRR  Abd- soft, non-tender, +BS, no rebound or guarding, fundus firm at umbilicus  Incision- C/D/I (bandage in place)  Ext- non-tender Pneumoboots in place lower extremities    Hemoglobin   Date Value Ref Range Status   2022 11.9 11.7 - 15.7 g/dL Final     O POS   Rubella NON immune    A/P:  27 year old  POD# 0 s/p PLTCS for arrest of cervical dilation in active labor, fetal status, remote from delivery.    1.  Routine post-op cares  - Advance diet  - Discontinue [place]  - Ambulate  - Pain management per routine  2. Feed  - Breast, will need pump as baby is in NICU  3. Heme  -  mL, check Hgb tomorrow  4. Rubella non immune  - MMR Prior to discharge home.    Anticipate d/c home on POD#3    Sujatha Álvarez MD  2022  8:21 AM   "

## 2022-08-02 NOTE — OP NOTE
Procedure Date: 2022    PREOPERATIVE DIAGNOSES:  Intrauterine pregnancy, at 38 weeks 1 day gestation, arrest of cervical dilation in active labor, fetal status, remote from delivery.    POSTOPERATIVE DIAGNOSES:  Intrauterine pregnancy at 38 weeks 1 day gestation, arrest of cervical dilation in active labor, fetal status, remote from delivery, with no evidence of an intrauterine infection.    PROCEDURE:  Primary low segment transverse  section.    SURGEON:  Kishor Springer MD    ANESTHESIA:  Epidural.    ESTIMATED BLOOD LOSS:  880 mL    PATIENT IDENTIFICATION:  Nissa Merida is a 27-year-old patient who is  2, para 0-0-1-0, at 38 weeks' gestation, admitted to Labor and Delivery at [facility] for evaluation of spontaneous labor.  The patient transferred care to our office at 24 weeks' gestation.  She had come from [place] where she did receive prenatal care and had many prenatal labs and ultrasounds performed there.  The pregnancy was complicated by the late transfer of care as well as the finding that the patient was rubella nonimmune.  Her blood type was O positive, rubella titer as noted was nonimmune.  One-hour glucose screen was normal.  Group B strep culture was positive at term.  The estimated fetal weight on admission was 8 pounds.  The patient had been seen in the maternal assessment area at [facility] late in the evening of 2022 into the morning of 2022.  The patient was evaluated and was not in labor.  She was discharged home.  She returned this time to [facility] due to staffing issues.  The patient reported regular contractions that were becoming increasingly uncomfortable.  She was evaluated in the maternal assessment area at [facility].  The cervix was noted to be 1-2 cm, 80%, vertex -2.  The amniotic membranes were intact.  The patient indeed was cyrus every couple of minutes.  She was observed and did receive  morphine and Vistaril for rest.  She was evaluated later that morning and the cervix was found to be 3 cm and more effaced.  Intravenous penicillin was begun given the cervical change and ongoing contractions.  The patient received several doses of penicillin during her labor and was adequately treated.  Fetal heart tones remained category 1.  The patient continued to contract and she did progress to 4 cm dilated, 90%, vertex -2.  Amniotomy was performed at 1600 hours and the fluid was clear.  The patient had received an epidural anesthetic at 1348 hours due to increasing contractions.  Her cervix was found to be 5 cm dilated.  As noted above, amniotomy was performed at approximately 1600 hours and clear fluid was obtained.  The patient continued to contract; however, no additional cervical dilation occurred over the next couple of hours.  The fetal heart tones had originally been category I, but transitioned to category II in the late evening of 08/01/2022 into the morning hours of 08/02/2022.  When the patient had not made significant progress beyond 5 cm, she did have an IUPC placed and this showed [place] units of 175.  The patient also had a very low-grade temperature with readings in the 100.2-100.4 range, the highest temperature identified orally was 100.5.  The maternal pulse was always less than 100.  The fetal heart rate did increase from a baseline of 150-160, but there was no overt tachycardia.  There was also no foul smelling amniotic fluid, and although difficult to assess with the epidural, no fundal tenderness.  An IUPC was placed again, which showed the less than optimal [place] units.  For this reason and for the fact that the fetal heart rate tracing generally appeared reassuring, intravenous oxytocin was begun to augment labor.  By 0134 hours, the fetal heart tones were minimal to moderate in variability with occasional late decelerations.  The baseline was 160.  The patient was given  Tylenol for the low-grade temperature.  The maternal pulse remained normal.  The fetal heart rate never became tachycardic and for this reason, the diagnosis of chorioamnionitis was not made in that it did not meet criteria.  The patient was then carefully observed after the initiation of Pitocin low dose.  However, the fetal heart rate tracing continued to show minimal to moderate variability and some persistent intermittent late decelerations.  She was reexamined at my request by 0234 hours and the cervix had not changed.  I did discuss with the patient and her  as well as the patient's mother the clinical scenario and the recommendation to proceed with a low transverse  section.  The options of continuing the augmentation and ongoing monitoring were offered, but when the  section was recommended, the patient with her family agreed.  Appropriate consent forms were signed, including for the  section as well as a possible blood transfusion, if necessary.   The patient's questions were answered.  She was interviewed with the assistance of the  on the iPad and the patient's family and the patient herself were satisfied with the explanation and discussion.    A brief history and physical was performed, and the patient was felt to be an excellent candidate for the scheduled procedure.    OPERATIVE FINDINGS:  At the time of surgery, and upon entering into the uterine cavity, the amniotic fluid was clear.  It was not turbid.  It had no foul odor, no evidence of chorioamnionitis.  The uterus and the uterine cavity as well as the placenta did not feel greater than body temperature.  The product of the pregnancy was an infant male, 8 pounds 2 ounces, lying in the direct occiput posterior position.  The baby was delivered atraumatically.  There was no nuchal cord.  The baby was brought out to the maternal abdomen, dried, and suctioned.  Eventually, the umbilical cord was doubly  clamped and ligated, and the baby was brought to the infant warmer.  As noted above, the product of the pregnancy an infant male, 8 pounds 2 ounces with Apgars of 5 and 8 at 1 and 5 minutes respectively.  The baby was thought to have some fluid in the small airways from the labor and from the  section.  The baby, however, was evaluated by the   team and observation was planned.  The remainder of the surgery was uncomplicated.  The uterus, tubes, and ovaries were normal.  The uterine cavity was normal.  A low transverse uterine incision was made and was repaired in 2 layers.  The blood loss was 880 mL.  Surgical specimens included the placenta as well as cord blood and cord blood gases.    OPERATIVE PROCEDURE:  Nissa Merida was brought to the operating room on Labor and Delivery at [facility].  She was placed in the left lateral tilt position and appropriately placed on the operating room table.  Once the patient was comfortable, the abdomen was prepped and draped in the usual fashion. A Yip catheter had been draining belkis urine during labor and continued to do so.  The epidural catheter was redosed and the patient received excellent anesthesia.  The patient was appropriately prepped and draped and all instruments were prepared.  The timeout was then held, during which the patient's identity, date of birth, medical record number, and the surgery to be performed were reviewed.  All members of the operating room staff were in agreement with the patient's procedure being a primary  section.  The patient had received penicillin G prophylaxis in labor and she also preoperatively received intravenous Ancef 2 grams and azithromycin.  Once the initial briefing was completed, the surgery began.  The patient had been prepped and draped in the usual fashion.  The debriefing had been completed.  The anesthetic was tested using the pinch technique.  Once this was affirmative, the  procedure began.  A low transverse skin incision was made and carried down through skin, fat and fascia.  The fascia was incised bilaterally.  The underlying rectus muscles were  from the fascia.  The rectus muscles were  in the midline.  The peritoneum was elevated and incised.  The peritoneal incision was extended manually.  The large Bob self-retaining retractor was placed and provided excellent visualization of the lower uterine segment.  The bladder flap was then taken down and the bladder mobilized from the lower uterine segment.  Hysterotomy was then performed and carried down through all layers of myometrium.  The uterine cavity was entered and there was residual clear fluid  present, not turbid and not foul smelling.  The uterine incision was extended with the bandage scissors and with manual stretching.  The baby was noted immediately to be direct occiput posterior.  A hand was then placed distal to the presenting vertex and the baby's head was delivered through the abdominal incision.  The baby was immediately placed on the mother's abdomen.  The umbilical cord doubly clamped and ligated, and the baby was brought to the infant warmer.  There, the  team was in attendance to evaluate the baby and to assign Apgars scores.  The baby's Apgar scores were 5 at 1 minute and 8 at 5 minutes.  The initial Apgar score was felt to be some transient tachypnea of the  and secretions in the airways.  The baby, however, did very well and was being monitored with O2 sats thereafter for 2 hours.  The placenta was then removed manually and was submitted for gross and microscopic examination.  The uterus was cleansed of all clot and membrane.  The uterus was closed in 2 layers, the first was a running locked stitch of 0 Vicryl, the second was an imbricating stitch of 0 Monocryl.  Excellent uterine approximation was achieved for future strength and possible V-Vac.  Hemostasis was confirmed  on the uterine closure after the imbrication suture had been placed.  There was no residual bleeding.  The bladder flap was then  reperitonealized using 3-0 Vicryl.  The Bob self-retaining retractor was then brought out from the abdomen.  Copious irrigation was performed.  All sponge and needle counts were correct.  The peritoneum was closed using 3-0 Vicryl in a running unlocked stitch.  Hemostasis was confirmed in the subfascial tissues.  Copious irrigation of the subcutaneous was performed, hemostasis achieved with electrocautery.  The subcutaneous was reapproximated using 2-0 plain gut in interrupted sutures.  The skin was closed using a subcuticular stitch of 4-0 Monocryl.  Steri-Strips were applied after a tincture of benzoin was applied.  An island dressing was placed.  The patient was then brought to recovery room in excellent condition having tolerated the procedure well.  There were no intraoperative complications.  Postoperative orders were completed including an order for the umbilical artery cord gases, the cord blood evaluation and the placenta.    Kishor Springer MD        D: 2022   T: 2022   MT: [facility]    Name:     MELANY VEGA  MRN:      [id number]        Account:        [id number]   :      1995           Procedure Date: 2022     Document: [id number]

## 2022-08-02 NOTE — PROVIDER NOTIFICATION
08/01/22 2226   Provider Notification   Provider Name/Title Dr. Jalloh, MDA   Method of Notification At Bedside     MDA at bedside for epidural rebolus.

## 2022-08-02 NOTE — PROVIDER NOTIFICATION
08/02/22 0026   Provider Notification   Provider Name/Title Dr. Springer   Method of Notification Phone   Request Evaluate - Remote   Notification Reason Status Update;Maternal Vital Sign Change     Updated provider on pt status. Placed IUPC, last . Cervix is unchanged and swollen. Pt cyrus every 2-4 min. Maternal temperatures remain increased at 100.4-100.5 F. FHR baseline 155-160s with moderate variability and accels, intermittent late decels noted.     MD ordered to start low dose pitocin and administer tylenol 650 mg PRN. Will notify provider with pt status in 1-2 hours.

## 2022-08-02 NOTE — PROGRESS NOTES
OB labor progress note    Discussion with the patient, her partner and the patients mother regarding the current status of the labor, the patient and the baby.     Labor has been spontaneous with AROM augmentation at 1600 hours. (no prolonged ROM)    Progress has been slow but dilation and effacement has advanced, along with fetal descent.    Analgesia is adequate with an epidural.     The patient is currently being carefully monitored for the onset of an intrauterine and intraamniotic infection with maternal temperature measurements, maternal pulse and fetal baseline heart rate.  Fundal tenderness is less helpful with the epidural.     The patients GBS colonization from the prenatal testing has been adequately treated.     An IUPC has been placed to determine the adequacy of labor.  The MVU's are 175, somewhat below the ideal measure of 200.     The fetal tracing has also been carefully monitored and has shown primarily a Category I tracing with occasional late decelerations (not repetitive) and occasional minimal variability.  This has been managed with position change and adequate fluid hydration.     Given the satisfactory fetal status (at present) and evidence of borderline inadequate labor, we will start low dose Pitocin to further augment labor in hopes of ongoing progress and cervical dilation.    If there continues to be inadequate progress or fetal status suggests delivery is indicated, we will proceed with primary  section.  This has been discussed with the patient, her partner and mother with confirmation of understanding regarding the current clinical situation and the potential for  delivery.     All questions were answered.       If evidence of triple I, will begin Ampicillin and Gentamicin, with the addition of Clindamycin for operative abdominal delivery.  IV Azithromycin will also be administered.      Discussed with the nursing staff and charge nurse and my immediate availability  was confirmed.

## 2022-08-02 NOTE — PROVIDER NOTIFICATION
08/01/22 2222   Provider Notification   Provider Name/Title Dr. Springer   Method of Notification Electronic Page     L Fuad SVE still 5-5.5/-1 with a very swollen cervix. Feeling a lot of rectal pressure; getting epidural rebolus now, then will place IUPC and try position changes.

## 2022-08-03 LAB
HGB BLD-MCNC: 11.4 G/DL (ref 11.7–15.7)
PATH REPORT.COMMENTS IMP SPEC: NORMAL
PATH REPORT.COMMENTS IMP SPEC: NORMAL
PATH REPORT.FINAL DX SPEC: NORMAL
PATH REPORT.GROSS SPEC: NORMAL
PATH REPORT.MICROSCOPIC SPEC OTHER STN: NORMAL
PATH REPORT.RELEVANT HX SPEC: NORMAL
PHOTO IMAGE: NORMAL

## 2022-08-03 PROCEDURE — 36415 COLL VENOUS BLD VENIPUNCTURE: CPT | Performed by: OBSTETRICS & GYNECOLOGY

## 2022-08-03 PROCEDURE — 88307 TISSUE EXAM BY PATHOLOGIST: CPT | Mod: 26 | Performed by: PATHOLOGY

## 2022-08-03 PROCEDURE — 85018 HEMOGLOBIN: CPT | Performed by: OBSTETRICS & GYNECOLOGY

## 2022-08-03 PROCEDURE — 250N000013 HC RX MED GY IP 250 OP 250 PS 637: Performed by: OBSTETRICS & GYNECOLOGY

## 2022-08-03 PROCEDURE — 120N000001 HC R&B MED SURG/OB

## 2022-08-03 RX ADMIN — ACETAMINOPHEN 975 MG: 325 TABLET, FILM COATED ORAL at 20:18

## 2022-08-03 RX ADMIN — ACETAMINOPHEN 975 MG: 325 TABLET, FILM COATED ORAL at 08:00

## 2022-08-03 RX ADMIN — ACETAMINOPHEN 975 MG: 325 TABLET, FILM COATED ORAL at 00:51

## 2022-08-03 RX ADMIN — SENNOSIDES AND DOCUSATE SODIUM 1 TABLET: 50; 8.6 TABLET ORAL at 08:01

## 2022-08-03 RX ADMIN — IBUPROFEN 800 MG: 800 TABLET, FILM COATED ORAL at 20:18

## 2022-08-03 RX ADMIN — ACETAMINOPHEN 975 MG: 325 TABLET, FILM COATED ORAL at 13:43

## 2022-08-03 RX ADMIN — IBUPROFEN 800 MG: 800 TABLET, FILM COATED ORAL at 08:00

## 2022-08-03 RX ADMIN — IBUPROFEN 800 MG: 800 TABLET, FILM COATED ORAL at 13:44

## 2022-08-03 RX ADMIN — SENNOSIDES AND DOCUSATE SODIUM 1 TABLET: 50; 8.6 TABLET ORAL at 20:18

## 2022-08-03 ASSESSMENT — ACTIVITIES OF DAILY LIVING (ADL)
ADLS_ACUITY_SCORE: 22

## 2022-08-03 NOTE — PLAN OF CARE
Assumed care at 1900: Patient stable. Pumping and getting drops of colostrum out, tolerating well. Tylenol and Toradol for pain control. Voiding. NICU IPad in room, visiting infant in NICU, speaking positively of infant. Significant other and Mother at bedside and supportive. Continue with plan of care.

## 2022-08-03 NOTE — PLAN OF CARE
Admitted to postpartum after C/S.  VSS. Fundal checks and bleeding WDL. No pain reported.  Up independently. Yip pulled but not voiding.  Bladder scanned and straight cathed for 400 ml @ 1430.  Bladder scanned at 1830 for 133.  Visiting baby in NICU.  Pumping q 3 hr with nothing out yet.

## 2022-08-03 NOTE — PLAN OF CARE
Vital signs stable. Postpartum assessment WDL. Pain well controlled. Dressing over incision is marked, no change noted. Up and ambulating; free of dizziness. Tolerating a regular diet. Pumping for  in NICU. Will continue to monitor. Questions/concerns addressed.

## 2022-08-03 NOTE — PLAN OF CARE
VSS. Up independently and voiding without difficulty. Patient is pumping every 3 hours and getting small drops of colostrum with each pumping session. Pain being managed with ordered medications. Gave patient abdominal binder to help with abdominal/incisional discomfort. Incision UTV. Fundus firm and midline. Lochia scant. Patient able to perform all of own cares.  and Mother at bedside and very supportive. Parents bonding well with infant in NICU.

## 2022-08-03 NOTE — PROGRESS NOTES
OB Post-op  Section Progress Note POD#1    S:  Patient doing well. Pain well controlled. Ambulating. Voiding. Tolerating regular diet, no N/V.  Pumping for baby in NICU.     O:    Vitals:    22 1130 22 1829 22 2241 22 2359   BP: 117/72 110/70 105/71 108/73   BP Location: Left arm Left arm Left arm Left arm   Patient Position:    Semi-Be's   Cuff Size:    Adult Regular   Pulse: 70 87 75 87   Resp: 20 20 18 16   Temp: 98.2  F (36.8  C) 98.3  F (36.8  C) 97.8  F (36.6  C) 97.7  F (36.5  C)   TempSrc: Oral Oral Oral Oral   SpO2:  99% 99%    Weight:       Height:         Gen- A&O, NAD  Abd- soft, appropriately tender, no rebound or guarding, fundus firm at umbilicus  Incision- C/D/I (bandage in place)  Ext- scant edema    Hemoglobin   Date Value Ref Range Status   2022 11.9 11.7 - 15.7 g/dL Final     O POS   Rubella NON immune    A/P:  27 year old  POD#1 s/p PLTCS for arrest of cervical dilation in active labor, fetal status, remote from delivery.    - Routine post-op cares  - Analgesia adequate  - Rh pos  - Rubella non immune - MMR prior to discharge  - Pumping, plans to breastfeed  -  mL, AM hgb pending. Asympatomatic  - Baby in NICU    Dispo: Anticipate d/c home on POD#2-3    Whitley Thurston MD  8/3/2022

## 2022-08-04 PROCEDURE — 999N000080 HC STATISTIC IP LACTATION SERVICES 16-30 MIN

## 2022-08-04 PROCEDURE — 250N000013 HC RX MED GY IP 250 OP 250 PS 637: Performed by: OBSTETRICS & GYNECOLOGY

## 2022-08-04 PROCEDURE — 120N000001 HC R&B MED SURG/OB

## 2022-08-04 RX ADMIN — ACETAMINOPHEN 975 MG: 325 TABLET, FILM COATED ORAL at 08:22

## 2022-08-04 RX ADMIN — IBUPROFEN 800 MG: 800 TABLET, FILM COATED ORAL at 15:51

## 2022-08-04 RX ADMIN — IBUPROFEN 800 MG: 800 TABLET, FILM COATED ORAL at 21:57

## 2022-08-04 RX ADMIN — ACETAMINOPHEN 975 MG: 325 TABLET, FILM COATED ORAL at 02:24

## 2022-08-04 RX ADMIN — ACETAMINOPHEN 975 MG: 325 TABLET, FILM COATED ORAL at 15:50

## 2022-08-04 RX ADMIN — ACETAMINOPHEN 975 MG: 325 TABLET, FILM COATED ORAL at 21:57

## 2022-08-04 RX ADMIN — SENNOSIDES AND DOCUSATE SODIUM 1 TABLET: 50; 8.6 TABLET ORAL at 08:22

## 2022-08-04 RX ADMIN — IBUPROFEN 800 MG: 800 TABLET, FILM COATED ORAL at 02:24

## 2022-08-04 RX ADMIN — IBUPROFEN 800 MG: 800 TABLET, FILM COATED ORAL at 08:22

## 2022-08-04 ASSESSMENT — ACTIVITIES OF DAILY LIVING (ADL)
ADLS_ACUITY_SCORE: 22

## 2022-08-04 NOTE — PLAN OF CARE
VSS.Eating and drinking, tolerating regular diet well. Voiding without difficulty. Pain well controlled with Tylenol and Ibuprofen. Independent in self cares. Bonding well with baby in NICU. Meeting expected goals. Supported in room by mother and . Continue to monitor.

## 2022-08-04 NOTE — LACTATION NOTE
Lactation in to see patient after infant transferred from NICU. Baby latched, after large drops of colostrum expressed. Eager to latch, needing a lot of stimulation to stay active. Nursed on both side for short periods of time. Plan to continue to pump afterwards to help bring in milk and supplement with donor. Education done with  phone. Nissa inquiring about obtaining donor milk. Verbal info given will give resource sheet to call to get milk. Knows to call for assistance prn.

## 2022-08-04 NOTE — PLAN OF CARE
Stable postoperative patient meeting all expected goals.  Incision is covered and dressing is dry with old drainage present.  No new drainage.  Pain controlled with ibuprofen and tylenol.  Patient is up ad anuja.

## 2022-08-04 NOTE — PLAN OF CARE
VSS. Incision WDL. Up independently in room, voiding without difficulty. Pain managed with ibuprofen and tylenol. Attempting breastfeeding and supplementing with donor milk. Positive bonding observed with infant.

## 2022-08-04 NOTE — PROGRESS NOTES
Post-partum Note      S: Patient is doing well today.  Pain is controlled with PO medications.  Tolerating regular diet without nausea or vomiting.  Ambulating without dizziness.  Urinating without difficulty. Lochia normal.  Pumping for baby in NICU, baby likely to be discharged to room today.     O:   Patient Vitals for the past 24 hrs:   BP Temp Temp src Pulse Resp   22 2342 122/87 98  F (36.7  C) Axillary 84 16   22 1548 100/68 97.9  F (36.6  C) Axillary 72 16     Gen:  Resting comfortably, NAD  Pulm:  Breathing comfortably on room air  Abd:  Soft, appropriately ttp, non-distended.Fundus at umbilicus, firm and non-tender.  Incision: intact   Ext:  non-tender, tr bilateral LE edema    No intake/output data recorded.    Hgb:     Hemoglobin   Date Value Ref Range Status   2022 11.4 (L) 11.7 - 15.7 g/dL Final   2022 11.9 11.7 - 15.7 g/dL Final       Assessment/Plan:  27 year old  on POD2 after arrest of cervical dilation in active labor, fetal status remote from delivery. Patient presented in spontaneous labor at 38w1d.     1. Continue with routine postpartum management  2. Incision is c/d/I with sutures  3. EBL: 880ml ; pre hemoglobin 11.9, post hemogobin 11.4, no symptoms of anemia.   4. O+ , Rubella immune, measles NON immune, plan for MMR, discussed with RN   5. Feed: Breastfeeding  6. CV/RESP: vss  7. DVT PPX: ambulation     Dispo: Anticipate DC home POD3 per patient request     MD Pily Dominguez OB/GYN  2022, 9:00 AM

## 2022-08-05 VITALS
RESPIRATION RATE: 16 BRPM | HEIGHT: 66 IN | OXYGEN SATURATION: 99 % | WEIGHT: 174 LBS | SYSTOLIC BLOOD PRESSURE: 108 MMHG | TEMPERATURE: 98.7 F | BODY MASS INDEX: 27.97 KG/M2 | HEART RATE: 77 BPM | DIASTOLIC BLOOD PRESSURE: 63 MMHG

## 2022-08-05 PROCEDURE — 250N000013 HC RX MED GY IP 250 OP 250 PS 637: Performed by: OBSTETRICS & GYNECOLOGY

## 2022-08-05 RX ORDER — OXYCODONE HYDROCHLORIDE 5 MG/1
5 TABLET ORAL EVERY 4 HOURS PRN
Qty: 10 TABLET | Refills: 0 | Status: SHIPPED | OUTPATIENT
Start: 2022-08-05

## 2022-08-05 RX ORDER — IBUPROFEN 800 MG/1
800 TABLET, FILM COATED ORAL EVERY 6 HOURS
COMMUNITY
Start: 2022-08-05

## 2022-08-05 RX ORDER — ACETAMINOPHEN 325 MG/1
975 TABLET ORAL EVERY 6 HOURS
COMMUNITY
Start: 2022-08-05

## 2022-08-05 RX ADMIN — ACETAMINOPHEN 975 MG: 325 TABLET, FILM COATED ORAL at 12:34

## 2022-08-05 RX ADMIN — ACETAMINOPHEN 975 MG: 325 TABLET, FILM COATED ORAL at 04:12

## 2022-08-05 RX ADMIN — IBUPROFEN 800 MG: 800 TABLET, FILM COATED ORAL at 04:12

## 2022-08-05 ASSESSMENT — ACTIVITIES OF DAILY LIVING (ADL)
ADLS_ACUITY_SCORE: 22

## 2022-08-05 NOTE — DISCHARGE INSTRUCTIONS
Birth Discharge Instructions: [place]  Actividad  No levante más de 10 libras sae las 6 semanas posteriores a mena cirugía. Pida a los miembros de mena glenn y amigos que la ayuden cuando lo necesite.  No conduzca si está tomando píldoras para el dolor recetadas por mena médico. Puede conducir si está tomando píldoras de venta jordon para el dolor.  No devyn ejercicio ni actividades intensas por 6 semanas. No devyn nada que requiera un esfuerzo en el sitio de mena cirugía.  No devyn fuerza al usar el baño. Mena equipo de atención podría recetarle un laxante si tiene problemas para  el intestino (estreñimiento).    Para cuidar de mena incisión:  Mantenga la incisión limpia y seca  No empape mena incisión en agua. No nade ni use la shonna ni jacuzzis hasta que mena incisión haya cicatrizado por completo. Puede sentarse en la shonna si el nivel del agua está por debajo de mena incisión.  Después de lavarse, seque shirley mena incisión. Incluya la piel que podría entrar en contacto con mena incisión.  No use agua oxigenada, gel, cremas, lociones ni ungüentos sobre mena incisión.  Ajuste mena ropa para evitar la presión en el sitio de mena cirugía (compruebe el elástico en mena ropa interior, por ejemplo)  Si tiene Steri-Strips, deje las pequeñas tiras de cinta en mena sitio. Se caerán solas o puede quitárselas después de jong semana.    Podría shannon jong pequeña cantidad de secreción transparente o rosada y esto es normal. Consulte con mena proveedor de atención médica:  Si el drenaje aumenta o tiene olor.  Si tiene hinchazón, enrojecimiento o jong erupción alrededor de la incisión.  Si tiene más dolor y mena medicamento para el dolor no ayuda  Si tiene fiebre de 100.4  F (38  C) o más (temperatura tomada bajo mena lengua) con o sin escalofríos     El área alrededor de mena incisión (herida de la cirugía) se sentirá adormecida. [place] es normal. El adormecimiento debería desaparecer en menos de un año.       Mantenga alysia silvana limpias:  Siempre lávese las  silvana antes de tocar mena incisión. [place] ayuda a reducir mena riesgo de infección. Si alysia silvana no están sucias, puede usar un gel de alcohol para limpiarse las silvana. Mantenga alysia uñas cortas y limpias.   Llame a mena proveedor de atención médica si tiene alguno de estos síntomas:  Empapa jong toalla femenina con kathy en el correr de 1 hora o ve coágulos más grandes que jong pelota de golf.  Sangrado que dura más de 6 semanas.  Tiene jong secreción vaginal que huele mal.  Dolor, calambres o sensibilidad graves en la región inferior de mena vientre.  Necesidad más frecuente o urgente de orinar (hacer pis), o ardor al hacerlo.  Náuseas y vómitos  Enrojecimiento, hinchazón o dolor alrededor de jong vena en mena pierna.  Problemas para amamantar o un área enrojecida o dolorosa en mena pecho.  Si tiene dolor en el pecho y tos o dificultad para respirar.  Problemas para manejar la tristeza, ansiedad o depresión.   Si le preocupa hacerse daño o hacerle daño al bebé, llame al médico de inmediato.  Tiene preguntas o inquietudes después de regresar a casa.       Birth Discharge Instructions  Activity  Do not lift more than 10 pounds for 6 weeks after surgery. Ask family and friends for help when you need it.  Do not drive while taking pain pills prescribed by your doctor. You may drive if taking over-the-counter pain pills.  No heavy exercise or activity for 6 weeks. Don t do anything that will put a strain on your surgery site.  Don t strain when using the toilet. Your care team may prescribe a stool softener if you have problems with your bowel movements (constipation).    To care for your incision:  Keep the incision clean and dry  Do not soak your incision in water. No swimming or hot tubs until your incision has fully healed. You may soak in the bathtub if the water level is below your incision.  After washing, dry your incision well. Include the skin that may come in contact with your incision.  Do not use any peroxide, gel,  cream, lotion, or ointment on your incision.   Adjust your clothes to avoid pressure on your surgery site (check the elastic in your underwear for example)  If you have Steri-Strips, leave the small strips of tape in place. They will fall off on their own, or you can remove them after one week.    You may see a small amount of clear or pink drainage and this is normal. Check with your health care provider:  If the drainage increases or has an odor.  If you have swelling, redness, or a rash around the incision.  If you have increased pain and your pain medicine doesn t help  If you have a fever of 100.4  F (38  C) or higher (temperature taken under your tongue), with or without chills   The area around your incision (surgery wound) will feel numb. This is normal. The numbness should go away in less than a year.   Keep your hands clean:   Always wash your hands before touching your incision. This helps reduce your risk of infection. If your hands aren t dirty, you may use an alcohol hand-rub to clean your hands. Keep your nails clean and short.     Call your health care provider if you have any of these symptoms:  You soak a sanitary pad with blood within 1 hour, or you see blood clots larger than a golf ball.  Bleeding that lasts more than 6 weeks.  You have vaginal discharge that smells bad.  Severe pain, cramping or tenderness in your lower belly area.  A more frequent or urgent need to urinate (pee), or it burns when you pee.  Nausea and vomiting.  Redness, swelling or pain around a vein in your leg.  Problems breastfeeding, or a red or painful area on your breast.  If you have chest pain and cough or are gasping for air.  Problems coping with sadness, anxiety, or depression.   If you have any concerns about hurting yourself of the baby, call your doctor right away.    You have questions or concerns after you return home.

## 2022-08-05 NOTE — PLAN OF CARE
Vitals stable & PP checks WNL. Pt. up independently, voiding without difficulty, 1 stool. Pt. breastfeeding, pumping & human donor milk 25-30ml. Incision with steri-strips & INDIRA. Uterine pain managed well with sched. Ibuprofen & Tylenol. Pt. & FOB bonding & attentive to infant. Pt's mother at bedside once FOB had to go home.

## 2022-08-05 NOTE — PROGRESS NOTES
Post-partum Note      S: Patient is doing well today.  Pain is controlled with PO medications.  Tolerating regular diet without nausea or vomiting.  Ambulating without dizziness.  Urinating without difficulty. Lochia normal. Has had BM. Baby now in room.    O:   Patient Vitals for the past 24 hrs:   BP Temp Temp src Pulse Resp   22 2332 114/71 98.5  F (36.9  C) Oral 77 16   22 1714 117/74 98.2  F (36.8  C) Oral 95 16   22 0911 103/65 97.9  F (36.6  C) Oral 77 16     Gen:  Resting comfortably, NAD  Pulm:  Breathing comfortably on room air  Abd:  Soft, appropriately ttp, non-distended.Fundus at umbilicus, firm and non-tender.  Incision: intact   Ext:  non-tender,1+ bilateral LE edema    No intake/output data recorded.    Hgb:     Hemoglobin   Date Value Ref Range Status   2022 11.4 (L) 11.7 - 15.7 g/dL Final   2022 11.9 11.7 - 15.7 g/dL Final       Assessment/Plan:  27 year old  on POD3 after arrest of cervical dilation in active labor, fetal status remote from delivery. Patient presented in spontaneous labor at 38w1d.     1. Continue with routine postpartum management  2. Incision is c/d/I with sutures  3. EBL: 880ml ; pre hemoglobin 11.9, post hemogobin 11.4, no symptoms of anemia.   4. O+ , Rubella immune, measles NON immune, plan for MMR, discussed with RN   5. Feed: Breastfeeding  6. CV/RESP: vss  7. DVT PPX: ambulation     Dispo: Home today. Reviewed discharge precautions.     Whitley Thurston MD  2022

## 2022-08-06 NOTE — PLAN OF CARE
Data: Vital signs WNL. Postpartum checks WNL - see flow record. Patient eating and drinking normally. Patient able to empty bladder independently and is up ambulating. Incision is approximated with scant drainage present - no signs of infection noted. Patient performing self cares and is able to care for infant. Patient is breastfeeding and supplementing with donor milk 25-30 mL.   Action: Patient medicated during the shift for incisional pain. See MAR.    Response: Positive attachment behaviors observed with infant. Support person present and supportive.  Plan: Anticipate discharge later this morning 8/5.     Update @1330 -  Discharge instructions completed.  Patient states she understands all discharge instructions and all her questions have been answered.  Verbalizes when she needs to return to clinic for follow up for herself and baby. Prescriptions reviewed and RX script sent home with patient to fill at their local pharmacy. Postpartum depression symptoms reviewed and encouraged frequent review of depression scale. Patient discharged to home with infant and spouse @ 3336.

## 2022-08-07 ENCOUNTER — HOSPITAL ENCOUNTER (EMERGENCY)
Facility: CLINIC | Age: 27
Discharge: HOME | End: 2022-08-07
Attending: EMERGENCY MEDICINE | Admitting: EMERGENCY MEDICINE
Payer: COMMERCIAL

## 2022-08-07 VITALS
DIASTOLIC BLOOD PRESSURE: 84 MMHG | HEART RATE: 56 BPM | OXYGEN SATURATION: 98 % | BODY MASS INDEX: 28.08 KG/M2 | HEIGHT: 66 IN | RESPIRATION RATE: 14 BRPM | TEMPERATURE: 97.4 F | SYSTOLIC BLOOD PRESSURE: 110 MMHG

## 2022-08-07 DIAGNOSIS — R11.2 NON-INTRACTABLE VOMITING WITH NAUSEA, UNSPECIFIED VOMITING TYPE: ICD-10-CM

## 2022-08-07 LAB
ALBUMIN SERPL-MCNC: 2.3 G/DL (ref 3.4–5)
ALP SERPL-CCNC: 107 U/L (ref 40–150)
ALT SERPL W P-5'-P-CCNC: 30 U/L (ref 0–50)
ANION GAP SERPL CALCULATED.3IONS-SCNC: 5 MMOL/L (ref 3–14)
AST SERPL W P-5'-P-CCNC: 35 U/L (ref 0–45)
BASOPHILS # BLD AUTO: 0 10E3/UL (ref 0–0.2)
BASOPHILS NFR BLD AUTO: 0 %
BILIRUB DIRECT SERPL-MCNC: <0.1 MG/DL (ref 0–0.2)
BILIRUB SERPL-MCNC: 0.4 MG/DL (ref 0.2–1.3)
BUN SERPL-MCNC: 11 MG/DL (ref 7–30)
CALCIUM SERPL-MCNC: 9 MG/DL (ref 8.5–10.1)
CHLORIDE BLD-SCNC: 107 MMOL/L (ref 94–109)
CO2 SERPL-SCNC: 27 MMOL/L (ref 20–32)
CREAT SERPL-MCNC: 0.62 MG/DL (ref 0.52–1.04)
EOSINOPHIL # BLD AUTO: 0.1 10E3/UL (ref 0–0.7)
EOSINOPHIL NFR BLD AUTO: 1 %
ERYTHROCYTE [DISTWIDTH] IN BLOOD BY AUTOMATED COUNT: 12 % (ref 10–15)
GFR SERPL CREATININE-BSD FRML MDRD: >90 ML/MIN/1.73M2
GLUCOSE BLD-MCNC: 92 MG/DL (ref 70–99)
HCT VFR BLD AUTO: 29.4 % (ref 35–47)
HGB BLD-MCNC: 10.1 G/DL (ref 11.7–15.7)
HOLD SPECIMEN: NORMAL
IMM GRANULOCYTES # BLD: 0.1 10E3/UL
IMM GRANULOCYTES NFR BLD: 2 %
LIPASE SERPL-CCNC: 110 U/L (ref 73–393)
LYMPHOCYTES # BLD AUTO: 1.9 10E3/UL (ref 0.8–5.3)
LYMPHOCYTES NFR BLD AUTO: 27 %
MCH RBC QN AUTO: 32.2 PG (ref 26.5–33)
MCHC RBC AUTO-ENTMCNC: 34.4 G/DL (ref 31.5–36.5)
MCV RBC AUTO: 94 FL (ref 78–100)
MONOCYTES # BLD AUTO: 0.5 10E3/UL (ref 0–1.3)
MONOCYTES NFR BLD AUTO: 6 %
NEUTROPHILS # BLD AUTO: 4.5 10E3/UL (ref 1.6–8.3)
NEUTROPHILS NFR BLD AUTO: 64 %
NRBC # BLD AUTO: 0 10E3/UL
NRBC BLD AUTO-RTO: 0 /100
PLATELET # BLD AUTO: 381 10E3/UL (ref 150–450)
POTASSIUM BLD-SCNC: 4 MMOL/L (ref 3.4–5.3)
PROT SERPL-MCNC: 6.1 G/DL (ref 6.8–8.8)
RBC # BLD AUTO: 3.14 10E6/UL (ref 3.8–5.2)
SODIUM SERPL-SCNC: 139 MMOL/L (ref 133–144)
WBC # BLD AUTO: 7.2 10E3/UL (ref 4–11)

## 2022-08-07 PROCEDURE — 80053 COMPREHEN METABOLIC PANEL: CPT | Performed by: EMERGENCY MEDICINE

## 2022-08-07 PROCEDURE — 96376 TX/PRO/DX INJ SAME DRUG ADON: CPT

## 2022-08-07 PROCEDURE — 99284 EMERGENCY DEPT VISIT MOD MDM: CPT | Mod: 25

## 2022-08-07 PROCEDURE — 96374 THER/PROPH/DIAG INJ IV PUSH: CPT

## 2022-08-07 PROCEDURE — 258N000003 HC RX IP 258 OP 636: Performed by: EMERGENCY MEDICINE

## 2022-08-07 PROCEDURE — 83690 ASSAY OF LIPASE: CPT | Performed by: EMERGENCY MEDICINE

## 2022-08-07 PROCEDURE — 250N000011 HC RX IP 250 OP 636: Performed by: EMERGENCY MEDICINE

## 2022-08-07 PROCEDURE — 82248 BILIRUBIN DIRECT: CPT | Performed by: EMERGENCY MEDICINE

## 2022-08-07 PROCEDURE — 36415 COLL VENOUS BLD VENIPUNCTURE: CPT | Performed by: EMERGENCY MEDICINE

## 2022-08-07 PROCEDURE — 85014 HEMATOCRIT: CPT | Performed by: EMERGENCY MEDICINE

## 2022-08-07 PROCEDURE — 96361 HYDRATE IV INFUSION ADD-ON: CPT

## 2022-08-07 RX ORDER — ONDANSETRON 4 MG/1
4 TABLET, ORALLY DISINTEGRATING ORAL EVERY 8 HOURS PRN
Qty: 10 TABLET | Refills: 0 | Status: SHIPPED | OUTPATIENT
Start: 2022-08-07 | End: 2022-08-10

## 2022-08-07 RX ORDER — ONDANSETRON 2 MG/ML
4 INJECTION INTRAMUSCULAR; INTRAVENOUS ONCE
Status: COMPLETED | OUTPATIENT
Start: 2022-08-07 | End: 2022-08-07

## 2022-08-07 RX ORDER — SODIUM CHLORIDE, SODIUM LACTATE, POTASSIUM CHLORIDE, CALCIUM CHLORIDE 600; 310; 30; 20 MG/100ML; MG/100ML; MG/100ML; MG/100ML
125 INJECTION, SOLUTION INTRAVENOUS CONTINUOUS
Status: DISCONTINUED | OUTPATIENT
Start: 2022-08-07 | End: 2022-08-07 | Stop reason: HOSPADM

## 2022-08-07 RX ADMIN — ONDANSETRON 4 MG: 2 INJECTION INTRAMUSCULAR; INTRAVENOUS at 04:47

## 2022-08-07 RX ADMIN — SODIUM CHLORIDE, POTASSIUM CHLORIDE, SODIUM LACTATE AND CALCIUM CHLORIDE 1000 ML: 600; 310; 30; 20 INJECTION, SOLUTION INTRAVENOUS at 03:53

## 2022-08-07 RX ADMIN — ONDANSETRON 4 MG: 2 INJECTION INTRAMUSCULAR; INTRAVENOUS at 01:07

## 2022-08-07 ASSESSMENT — ENCOUNTER SYMPTOMS
NAUSEA: 1
VOMITING: 1
FEVER: 0
DIARRHEA: 0

## 2022-08-07 NOTE — ED PROVIDER NOTES
"  History   Chief Complaint:  Vomiting        HPI   Nissa Merida is a 27 year old female who presents with vomiting. The patient reports that she has been having nauseas and vomit 5 times today since the morning. She reports that the symptoms started after she gave birth by  on Friday. She denies having diarrhea, fever, abnormal urination and bowel movement.    Review of Systems   Constitutional: Negative for fever.   Respiratory: Negative for shortness of breath.    Cardiovascular: Negative for chest pain.   Gastrointestinal: Positive for nausea and vomiting. Negative for abdominal pain and diarrhea.   All other systems reviewed and are negative.    Allergies:  The patient has no known allergies.     Medications:  Tylenol  Advil  Roxicodone    Past Medical History:     Indication for care in labor  Encounter for triage in pregnant  Post- operative state    Past Surgical History:     section    Family History:    No family history on file.    Social History:  The patient presents to the ED with her significant other.    Physical Exam     Patient Vitals for the past 24 hrs:   BP Temp Temp src Pulse Resp SpO2 Height   22 0525 110/84 -- -- 56 -- 98 % --   22 0355 102/71 -- -- 59 -- 98 % --   22 0051 114/78 97.4  F (36.3  C) Temporal 65 14 99 % 1.676 m (5' 6\")       Physical Exam  General: Appears well-developed and well-nourished.   Head: No signs of trauma.   CV: Normal rate and regular rhythm.    Resp: Effort normal and breath sounds normal. No respiratory distress.   GI: Soft. There is no tenderness.  No rebound or guarding.  Normal bowel sounds.  No CVA tenderness.  MSK: Normal range of motion. no edema. No Calf tenderness.  Neuro: The patient is alert and oriented. Speech normal.  Skin: Skin is warm and dry. No rash noted.  Abd incision intact without erythema or drainage.  Psych: normal mood and affect. behavior is normal.       Emergency Department Course     Laboratory:  Labs " Ordered and Resulted from Time of ED Arrival to Time of ED Departure   CBC WITH PLATELETS AND DIFFERENTIAL - Abnormal       Result Value    WBC Count 7.2      RBC Count 3.14 (*)     Hemoglobin 10.1 (*)     Hematocrit 29.4 (*)     MCV 94      MCH 32.2      MCHC 34.4      RDW 12.0      Platelet Count 381      % Neutrophils 64      % Lymphocytes 27      % Monocytes 6      % Eosinophils 1      % Basophils 0      % Immature Granulocytes 2      NRBCs per 100 WBC 0      Absolute Neutrophils 4.5      Absolute Lymphocytes 1.9      Absolute Monocytes 0.5      Absolute Eosinophils 0.1      Absolute Basophils 0.0      Absolute Immature Granulocytes 0.1      Absolute NRBCs 0.0     HEPATIC FUNCTION PANEL - Abnormal    Bilirubin Total 0.4      Bilirubin Direct <0.1      Protein Total 6.1 (*)     Albumin 2.3 (*)     Alkaline Phosphatase 107      AST 35      ALT 30     BASIC METABOLIC PANEL - Normal    Sodium 139      Potassium 4.0      Chloride 107      Carbon Dioxide (CO2) 27      Anion Gap 5      Urea Nitrogen 11      Creatinine 0.62      Calcium 9.0      Glucose 92      GFR Estimate >90     LIPASE - Normal    Lipase 110          Emergency Department Course:         Reviewed:  I reviewed nursing notes, vitals, past medical history and Care Everywhere    Assessments:  344 I obtained history and examined the patient as noted above.    I rechecked the patient and explained findings.    Interventions:  0107 Zofran 4 mg IV  0353 Lactated ringers 1000 ml IV  0447 Zofran 4 mg IV    Disposition:  The patient was discharged to home.     Impression & Plan       Medical Decision Making:  Nissa Merida is a 27-year-old woman who presents due to vomiting.  She had a  a few days ago and had been doing well.  Today she developed few episodes of vomiting prompting her to come to the hospital.  She denies any pain and has not actually had to use any of the oxycodone she had been discharged with.  She has been having bowel movements.   Evaluation showed a benign abdominal exam.  Blood obtained was reassuring.  She was given IV fluids and Zofran with improvement of her symptoms and she was able to tolerate p.o.  I did not feel that imaging was necessary.  She was discharged with admission for continued supportive care and follow-up with her OB doctor.        Diagnosis:    ICD-10-CM    1. Non-intractable vomiting with nausea, unspecified vomiting type  R11.2        Discharge Medications:  Discharge Medication List as of 8/7/2022  5:25 AM      START taking these medications    Details   ondansetron (ZOFRAN ODT) 4 MG ODT tab Take 1 tablet (4 mg) by mouth every 8 hours as needed for nausea, Disp-10 tablet, R-0, E-Prescribe             Scribe Disclosure:  ERICK LINDSAY, am serving as a scribe at 3:44 AM on 8/7/2022 to document services personally performed by Marco Simon MD, based on my observations and the provider's statements to me.              Marco Simon MD  08/08/22 9708

## 2022-08-07 NOTE — ED NOTES
Pt able to drink a cup of water without vomiting.  Pt reports feeling nauseous, but has not had to vomit.

## 2022-08-07 NOTE — ED TRIAGE NOTES
Triage Assessment     Row Name 22 0055       Triage Assessment (Adult)    Airway WDL WDL       Respiratory WDL    Respiratory WDL WDL       Skin Circulation/Temperature WDL    Skin Circulation/Temperature WDL WDL       Cardiac WDL    Cardiac WDL WDL       Peripheral/Neurovascular WDL    Peripheral Neurovascular WDL WDL       Cognitive/Neuro/Behavioral WDL    Cognitive/Neuro/Behavioral WDL WDL            Nausea and vomiting the last few days. Pt. Had a  delivery without complication on Tuesday.

## 2022-08-08 ASSESSMENT — ENCOUNTER SYMPTOMS
SHORTNESS OF BREATH: 0
ABDOMINAL PAIN: 0

## 2022-08-11 ENCOUNTER — LAB REQUISITION (OUTPATIENT)
Dept: LAB | Facility: CLINIC | Age: 27
End: 2022-08-11

## 2022-08-11 LAB
BASOPHILS # BLD AUTO: 0 10E3/UL (ref 0–0.2)
BASOPHILS NFR BLD AUTO: 0 %
EOSINOPHIL # BLD AUTO: 0.1 10E3/UL (ref 0–0.7)
EOSINOPHIL NFR BLD AUTO: 1 %
ERYTHROCYTE [DISTWIDTH] IN BLOOD BY AUTOMATED COUNT: 12.3 % (ref 10–15)
HCT VFR BLD AUTO: 35.2 % (ref 35–47)
HGB BLD-MCNC: 11.9 G/DL (ref 11.7–15.7)
HOLD SPECIMEN: NORMAL
IMM GRANULOCYTES # BLD: 0.1 10E3/UL
IMM GRANULOCYTES NFR BLD: 1 %
LYMPHOCYTES # BLD AUTO: 2.3 10E3/UL (ref 0.8–5.3)
LYMPHOCYTES NFR BLD AUTO: 25 %
MCH RBC QN AUTO: 32.6 PG (ref 26.5–33)
MCHC RBC AUTO-ENTMCNC: 33.8 G/DL (ref 31.5–36.5)
MCV RBC AUTO: 96 FL (ref 78–100)
MONOCYTES # BLD AUTO: 0.7 10E3/UL (ref 0–1.3)
MONOCYTES NFR BLD AUTO: 8 %
NEUTROPHILS # BLD AUTO: 5.9 10E3/UL (ref 1.6–8.3)
NEUTROPHILS NFR BLD AUTO: 65 %
NRBC # BLD AUTO: 0 10E3/UL
NRBC BLD AUTO-RTO: 0 /100
PLATELET # BLD AUTO: 674 10E3/UL (ref 150–450)
RBC # BLD AUTO: 3.65 10E6/UL (ref 3.8–5.2)
WBC # BLD AUTO: 9.1 10E3/UL (ref 4–11)

## 2022-08-11 PROCEDURE — 85025 COMPLETE CBC W/AUTO DIFF WBC: CPT | Performed by: OBSTETRICS & GYNECOLOGY

## 2022-08-22 NOTE — DISCHARGE SUMMARY
Service Date: 2022  Discharge Date: 2022    ADMISSION DIAGNOSIS:  Intrauterine pregnancy at 38 weeks' gestation, spontaneous active labor, group B strep positive.    DISCHARGE DIAGNOSES:  Intrauterine pregnancy at 38 weeks' gestation, spontaneous active labor, group B strep positive, status post amniotomy and Pitocin augmented labor, arrest of cervical dilation in active labor, fetal status, remote from delivery, status post primary low segment transverse  section.    PATIENT IDENTIFICATION:  Nissa Merida is a 27-year-old  2, para 0-0-1-0, at 38 weeks' gestation, admitted to Labor and Delivery at [facility] for evaluation of spontaneous labor.  The patient transferred care to our office in 24 weeks' gestation.  Her early prenatal care was in the [place], as her  is a player for the [employer].  The patient had prenatal labs and ultrasounds performed during early pregnancy.  The pregnancy was therefore complicated by the transfer of care at 24 weeks and the finding was that the patient was measles nonimmune.  Her blood type was O positive, rubella titer showed immunity.  One-hour glucose screen was normal.  Group B strep culture was positive at term.  The estimated fetal weight on admission was 8 pounds.  The patient had been seen in the maternal assessment area at [facility] late in the evening on   into the morning of 2022.  The patient was evaluated and was not in labor.  She was discharged home.  She returned to [facility] due to staffing issues.    HOSPITAL COURSE:  The patient was evaluated with regular contractions that were becoming increasingly uncomfortable.  The cervix was 1-2 cm, 80% effaced, vertex -2.  Amniotic membranes were intact.  The patient was cyrus every 2-3 minutes.  She was observed and received morphine and Vistaril for rest.  She was evaluated later that morning and the cervix was found  to be 3 cm dilated and more effaced.  Intravenous penicillin was begun given the cervical change and her ongoing contractions and group B strep positive status.  The patient received several doses of penicillin during her labor was adequately treated.  Fetal heart tones remained category 1 as the patient progressed in labor through the day on 08/01/2022.  She advanced to 4 cm dilated, 90% effaced, vertex -2.  Amniotomy was performed at 1600 hours and the fluid was clear.  The patient had received an epidural anesthetic at 1348 and this provided excellent relief.  The patient continued to have contractions; however, no additional cervical dilation occurred over the next couple of hours.  The fetal heart tones were originally category 1, but transitioned to category 2 and the late evening of 08/01/2022 and into the early morning hours of 08/02/2022.  The patient had not made significant progress beyond 5 cm, an intrauterine pressure catheter was placed and this showed [place] units of 175.  The patient also had a low-grade temperature of 100.2-100.4.  The highest temperature identified orally was 100.5.  The maternal pulse was always less than 100 and the fetal heart rate did increase to the 150-160 range, but no overt tachycardia.  There was also no foul smelling amniotic fluid and, although difficult to assess with the epidural, no fundal tenderness was present.  An intrauterine pressure catheter had been placed showing the suboptimal [place] units and for this reason intravenous oxytocin was begun to augment the labor.  The fetal heart rate tracing appeared reassuring when the augmentation began.  By 0134 hours on 08/02/2022, fetal heart tones showed minimal to moderate variability with occasional late decelerations.  The baseline was 160.  The patient was given Tylenol for low-grade temperature.  The maternal pulse remained normal.  The fetal heart rate never became tachycardic and for this reason, a  diagnosis of chorioamnionitis was not made and that it did not meet criteria.  The patient was carefully observed after the initiation of Pitocin low dose.  The fetal heart rate tracing continued to show minimal to moderate variability and some persistent intermittent late decelerations.  She was reexamined at my request at 234 and the cervix had not changed.  The clinical status was discussed with the patient and her  as well as the patient's mother, and the recommendation was given to proceed with  section.  The indication for that  was arrest of cervical dilation as well as fetal status, remote from delivery.  This was reviewed with the patient, with the help of the  on the iPad.  A brief history and physical was performed and the patient was prepared for surgery.  On 2022 at 0344 hours, the patient was delivered of an infant male, 8 pounds 2 ounces with Apgars of five and eight at 1 and 5 minutes respectively.  The baby was in a direct occiput posterior position.  The amniotic fluid was clear, it was not turbid and did not have a foul odor.  There was no evidence of chorioamnionitis.  Baby was evaluated by the  nurse practitioner team and appropriate measures were taken when the baby was noted to have some respiratory distress.  The baby was stabilized in the delivery room and transported to the nursery and subsequently to the NICU.  The arterial umbilical pH was 7.20.  The umbilical venous pH was 7.13.  The baby was delivered atraumatically and there was no evidence of congenital anomalies.  The uterus, tubes, and ovaries were normal.  The placenta appeared normal and there was no evidence of acute funisitis.  The surgery was without complication.  The quantitative blood loss was 880 mL.  The baby was  brought to the nursery for further observation and as noted above, subsequently transferred to the NICU for evaluation of respiratory compromise and rule out  sepsis.    The patient's postoperative course was unremarkable.  She had a postoperative hemoglobin of 11.4 grams %.  She had no significant febrile morbidity.  She had been noted to be measles nonimmune, and she did receive an MMR prior to discharge.      On postoperative day #3, the baby had a negative blood culture and after being treated for rule out sepsis, the baby was discharged from the NICU to the normal nursery.    On postoperative day #3, 2022, the patient was ready for hospital discharge.  Her Pfannenstiel incision was intact and healing well.  The patient was afebrile with normal vital signs.  She was discharged to return to our office for routine postpartum care.  She will be seen in she will be seen in 6 weeks unless there are complications or issues in the interim.  The patient was discharged with appropriate analgesia and instructions to call or return should she have any concerns.  All of her questions were answered to her satisfaction.    Kishor Springer MD        D: 2022   T: 2022   MT: CODY    Name:     MELANY VEGA  MRN:      [id number]        Account:      [id number]   :      1995           Service Date: 2022                                  Discharge Date: 2022     Document: [id number]    cc:  Kishor Springer MD

## 2022-10-03 ENCOUNTER — HEALTH MAINTENANCE LETTER (OUTPATIENT)
Age: 27
End: 2022-10-03

## 2023-10-22 ENCOUNTER — HEALTH MAINTENANCE LETTER (OUTPATIENT)
Age: 28
End: 2023-10-22

## 2024-12-15 ENCOUNTER — HEALTH MAINTENANCE LETTER (OUTPATIENT)
Age: 29
End: 2024-12-15

## (undated) DEVICE — SU MONOCRYL 4-0 PS-2 27" UND Y426H

## (undated) DEVICE — LINEN HALF SHEET 5512

## (undated) DEVICE — SU VICRYL 0 CT-1 36" J346H

## (undated) DEVICE — LINEN BABY BLANKET 5434

## (undated) DEVICE — SU MONOCRYL 0 CTX 36" Y398H

## (undated) DEVICE — SU VICRYL 3-0 CT-1 36" J344H

## (undated) DEVICE — BLADE CLIPPER SGL USE 9680

## (undated) DEVICE — Device

## (undated) DEVICE — PACK C-SECTION LF PL15OTA83B

## (undated) DEVICE — CAP BABY PINK/BLUE IC-2

## (undated) DEVICE — PREP CHLORAPREP 26ML TINTED ORANGE  260815

## (undated) DEVICE — SOL NACL 0.9% IRRIG 1000ML BOTTLE 2F7124

## (undated) DEVICE — SOL WATER IRRIG 1000ML BOTTLE 2F7114

## (undated) DEVICE — TRANSFER DEVICE BLOOD NDL HOLDER 364880

## (undated) DEVICE — SU PLAIN 2-0 CT-1 27" 843H

## (undated) DEVICE — BAG CLEAR TRASH 1.3M 39X33" P4040C

## (undated) DEVICE — SOL ADH LIQUID BENZOIN SWAB 0.6ML C1544

## (undated) DEVICE — ESU GROUND PAD ADULT W/CORD E7507

## (undated) DEVICE — LINEN FULL SHEET 5511

## (undated) DEVICE — GLOVE PROTEXIS POWDER FREE 7.5 ORTHOPEDIC 2D73ET75

## (undated) DEVICE — DRSG STERI STRIP 1/2X4" R1547

## (undated) DEVICE — STOCKING SLEEVE VASOPRESS COMPRESSION CALF MED VP501M

## (undated) DEVICE — CATH TRAY FOLEY 16FR SILICONE 907416

## (undated) DEVICE — LINEN TOWEL PACK X10 5473

## (undated) RX ORDER — FENTANYL CITRATE-0.9 % NACL/PF 10 MCG/ML
PLASTIC BAG, INJECTION (ML) INTRAVENOUS
Status: DISPENSED
Start: 2022-08-02

## (undated) RX ORDER — MORPHINE SULFATE 1 MG/ML
INJECTION, SOLUTION EPIDURAL; INTRATHECAL; INTRAVENOUS
Status: DISPENSED
Start: 2022-08-02

## (undated) RX ORDER — ONDANSETRON 2 MG/ML
INJECTION INTRAMUSCULAR; INTRAVENOUS
Status: DISPENSED
Start: 2022-08-02

## (undated) RX ORDER — METHYLERGONOVINE MALEATE 0.2 MG/ML
INJECTION INTRAVENOUS
Status: DISPENSED
Start: 2022-08-02